# Patient Record
Sex: FEMALE | Race: BLACK OR AFRICAN AMERICAN | NOT HISPANIC OR LATINO | Employment: FULL TIME | URBAN - METROPOLITAN AREA
[De-identification: names, ages, dates, MRNs, and addresses within clinical notes are randomized per-mention and may not be internally consistent; named-entity substitution may affect disease eponyms.]

---

## 2017-06-20 ENCOUNTER — OFFICE VISIT - HEALTHEAST (OUTPATIENT)
Dept: FAMILY MEDICINE | Facility: CLINIC | Age: 52
End: 2017-06-20

## 2017-06-20 ENCOUNTER — RECORDS - HEALTHEAST (OUTPATIENT)
Dept: GENERAL RADIOLOGY | Facility: CLINIC | Age: 52
End: 2017-06-20

## 2017-06-20 DIAGNOSIS — R94.31 ABNORMAL EKG: ICD-10-CM

## 2017-06-20 DIAGNOSIS — Z11.3 SCREENING FOR STD (SEXUALLY TRANSMITTED DISEASE): ICD-10-CM

## 2017-06-20 DIAGNOSIS — E04.2 MULTIPLE THYROID NODULES: ICD-10-CM

## 2017-06-20 DIAGNOSIS — R07.9 CHEST PAIN, UNSPECIFIED: ICD-10-CM

## 2017-06-20 DIAGNOSIS — E66.9 OBESITY: ICD-10-CM

## 2017-06-20 DIAGNOSIS — D50.9 IRON DEFICIENCY ANEMIA: ICD-10-CM

## 2017-06-20 DIAGNOSIS — R07.9 CHEST PAIN, UNSPECIFIED TYPE: ICD-10-CM

## 2017-06-20 DIAGNOSIS — Z00.00 ROUTINE GENERAL MEDICAL EXAMINATION AT A HEALTH CARE FACILITY: ICD-10-CM

## 2017-06-20 LAB
ATRIAL RATE - MUSE: 58 BPM
DIASTOLIC BLOOD PRESSURE - MUSE: NORMAL MMHG
HIV 1+2 AB+HIV1 P24 AG SERPL QL IA: NEGATIVE
INTERPRETATION ECG - MUSE: NORMAL
P AXIS - MUSE: 56 DEGREES
PR INTERVAL - MUSE: 136 MS
QRS DURATION - MUSE: 76 MS
QT - MUSE: 434 MS
QTC - MUSE: 426 MS
R AXIS - MUSE: 45 DEGREES
SYSTOLIC BLOOD PRESSURE - MUSE: NORMAL MMHG
T AXIS - MUSE: 25 DEGREES
VENTRICULAR RATE- MUSE: 58 BPM

## 2017-06-20 ASSESSMENT — MIFFLIN-ST. JEOR: SCORE: 1372.46

## 2017-06-21 ENCOUNTER — HOSPITAL ENCOUNTER (OUTPATIENT)
Dept: MAMMOGRAPHY | Facility: CLINIC | Age: 52
Discharge: HOME | End: 2017-06-21
Attending: NURSE PRACTITIONER

## 2017-06-21 DIAGNOSIS — Z00.00 ROUTINE GENERAL MEDICAL EXAMINATION AT A HEALTH CARE FACILITY: ICD-10-CM

## 2017-06-21 LAB — SYPHILIS RPR SCREEN - HISTORICAL: NORMAL

## 2017-06-22 ENCOUNTER — COMMUNICATION - HEALTHEAST (OUTPATIENT)
Dept: FAMILY MEDICINE | Facility: CLINIC | Age: 52
End: 2017-06-22

## 2017-07-03 ENCOUNTER — HOSPITAL ENCOUNTER (OUTPATIENT)
Dept: CARDIOLOGY | Facility: CLINIC | Age: 52
Discharge: HOME | End: 2017-07-03
Attending: NURSE PRACTITIONER

## 2017-07-03 DIAGNOSIS — R07.9 CHEST PAIN, UNSPECIFIED TYPE: ICD-10-CM

## 2017-07-03 DIAGNOSIS — R94.31 ABNORMAL EKG: ICD-10-CM

## 2017-07-03 LAB
CV STRESS CURRENT BP HE: NORMAL
CV STRESS CURRENT HR HE: 102
CV STRESS CURRENT HR HE: 113
CV STRESS CURRENT HR HE: 115
CV STRESS CURRENT HR HE: 117
CV STRESS CURRENT HR HE: 124
CV STRESS CURRENT HR HE: 127
CV STRESS CURRENT HR HE: 128
CV STRESS CURRENT HR HE: 130
CV STRESS CURRENT HR HE: 143
CV STRESS CURRENT HR HE: 145
CV STRESS CURRENT HR HE: 147
CV STRESS CURRENT HR HE: 157
CV STRESS CURRENT HR HE: 157
CV STRESS CURRENT HR HE: 160
CV STRESS CURRENT HR HE: 163
CV STRESS CURRENT HR HE: 168
CV STRESS CURRENT HR HE: 173
CV STRESS CURRENT HR HE: 174
CV STRESS CURRENT HR HE: 174
CV STRESS CURRENT HR HE: 175
CV STRESS CURRENT HR HE: 175
CV STRESS CURRENT HR HE: 176
CV STRESS CURRENT HR HE: 83
CV STRESS CURRENT HR HE: 86
CV STRESS CURRENT HR HE: 87
CV STRESS CURRENT HR HE: 92
CV STRESS CURRENT HR HE: 94
CV STRESS CURRENT HR HE: 97
CV STRESS CURRENT HR HE: 99
CV STRESS DEVIATION TIME HE: NORMAL
CV STRESS ECHO PERCENT HR HE: NORMAL
CV STRESS EXERCISE STAGE HE: NORMAL
CV STRESS FINAL RESTING BP HE: NORMAL
CV STRESS FINAL RESTING HR HE: 92
CV STRESS MAX HR HE: 176
CV STRESS MAX TREADMILL GRADE HE: 14
CV STRESS MAX TREADMILL SPEED HE: 3.4
CV STRESS PEAK DIA BP HE: NORMAL
CV STRESS PEAK SYS BP HE: NORMAL
CV STRESS PHASE HE: NORMAL
CV STRESS PROTOCOL HE: NORMAL
CV STRESS RESTING PT POSITION HE: NORMAL
CV STRESS ST DEVIATION AMOUNT HE: NORMAL
CV STRESS ST DEVIATION ELEVATION HE: NORMAL
CV STRESS ST EVELATION AMOUNT HE: NORMAL
CV STRESS TEST TYPE HE: NORMAL
CV STRESS TOTAL STAGE TIME MIN 1 HE: NORMAL
ECHO EJECTION FRACTION ESTIMATED: 60 %
STRESS ECHO BASELINE BP: NORMAL
STRESS ECHO CALCULATED PERCENT HR: 103 %
STRESS ECHO LAST STRESS BP: NORMAL
STRESS ECHO LAST STRESS HR: 175
STRESS ECHO POST ESTIMATED WORKLOAD: 10.3
STRESS ECHO POST EXERCISE DUR MIN: 8
STRESS ECHO POST EXERCISE DUR SEC: 59
STRESS ECHO TARGET HR: 145

## 2017-09-12 ENCOUNTER — OFFICE VISIT - HEALTHEAST (OUTPATIENT)
Dept: FAMILY MEDICINE | Facility: CLINIC | Age: 52
End: 2017-09-12

## 2017-09-12 DIAGNOSIS — R49.0 HOARSENESS OF VOICE: ICD-10-CM

## 2017-09-12 DIAGNOSIS — E04.2 MULTINODULAR GOITER (NONTOXIC): ICD-10-CM

## 2017-09-12 ASSESSMENT — MIFFLIN-ST. JEOR: SCORE: 1369.29

## 2017-09-13 ENCOUNTER — HOSPITAL ENCOUNTER (OUTPATIENT)
Dept: ULTRASOUND IMAGING | Facility: CLINIC | Age: 52
Discharge: HOME | End: 2017-09-13
Attending: NURSE PRACTITIONER

## 2017-09-13 DIAGNOSIS — E04.2 MULTINODULAR GOITER (NONTOXIC): ICD-10-CM

## 2017-09-15 ENCOUNTER — COMMUNICATION - HEALTHEAST (OUTPATIENT)
Dept: ENDOCRINOLOGY | Facility: CLINIC | Age: 52
End: 2017-09-15

## 2017-09-15 ENCOUNTER — AMBULATORY - HEALTHEAST (OUTPATIENT)
Dept: FAMILY MEDICINE | Facility: CLINIC | Age: 52
End: 2017-09-15

## 2017-09-15 ENCOUNTER — COMMUNICATION - HEALTHEAST (OUTPATIENT)
Dept: FAMILY MEDICINE | Facility: CLINIC | Age: 52
End: 2017-09-15

## 2017-09-15 DIAGNOSIS — E04.9 THYROID GOITER: ICD-10-CM

## 2017-09-15 DIAGNOSIS — E04.1 THYROID NODULE: ICD-10-CM

## 2017-10-03 ENCOUNTER — HOSPITAL ENCOUNTER (OUTPATIENT)
Dept: ULTRASOUND IMAGING | Facility: CLINIC | Age: 52
Discharge: HOME | End: 2017-10-03
Attending: INTERNAL MEDICINE

## 2017-10-03 DIAGNOSIS — E04.1 THYROID NODULE: ICD-10-CM

## 2017-10-04 LAB
LAB AP CHARGES (HE HISTORICAL CONVERSION): NORMAL
LAB AP INITIAL CYTO EVAL (HE HISTORICAL CONVERSION): NORMAL
LAB MED GENERAL PATH INTERP (HE HISTORICAL CONVERSION): NORMAL
PATH REPORT.COMMENTS IMP SPEC: NORMAL
PATH REPORT.COMMENTS IMP SPEC: NORMAL
PATH REPORT.FINAL DX SPEC: NORMAL
PATH REPORT.RELEVANT HX SPEC: NORMAL
SPECIMEN DESCRIPTION: NORMAL

## 2017-11-09 ENCOUNTER — OFFICE VISIT - HEALTHEAST (OUTPATIENT)
Dept: OTOLARYNGOLOGY | Facility: CLINIC | Age: 52
End: 2017-11-09

## 2017-11-09 DIAGNOSIS — K21.9 LARYNGOPHARYNGEAL REFLUX (LPR): ICD-10-CM

## 2017-11-09 DIAGNOSIS — J37.0 CHRONIC LARYNGITIS: ICD-10-CM

## 2017-11-09 DIAGNOSIS — J38.3 VOCAL FOLD CYST: ICD-10-CM

## 2017-11-09 DIAGNOSIS — E04.2 MULTINODULAR GOITER: ICD-10-CM

## 2017-12-28 ENCOUNTER — COMMUNICATION - HEALTHEAST (OUTPATIENT)
Dept: FAMILY MEDICINE | Facility: CLINIC | Age: 52
End: 2017-12-28

## 2018-01-02 ENCOUNTER — OFFICE VISIT - HEALTHEAST (OUTPATIENT)
Dept: FAMILY MEDICINE | Facility: CLINIC | Age: 53
End: 2018-01-02

## 2018-01-02 DIAGNOSIS — R53.83 FATIGUE, UNSPECIFIED TYPE: ICD-10-CM

## 2018-01-02 LAB
ALBUMIN SERPL-MCNC: 3.4 G/DL (ref 3.5–5)
ALP SERPL-CCNC: 97 U/L (ref 45–120)
ALT SERPL W P-5'-P-CCNC: 14 U/L (ref 0–45)
ANION GAP SERPL CALCULATED.3IONS-SCNC: 10 MMOL/L (ref 5–18)
AST SERPL W P-5'-P-CCNC: 18 U/L (ref 0–40)
BILIRUB SERPL-MCNC: 0.3 MG/DL (ref 0–1)
BUN SERPL-MCNC: 10 MG/DL (ref 8–22)
CALCIUM SERPL-MCNC: 9.2 MG/DL (ref 8.5–10.5)
CHLORIDE BLD-SCNC: 107 MMOL/L (ref 98–107)
CO2 SERPL-SCNC: 25 MMOL/L (ref 22–31)
CREAT SERPL-MCNC: 0.75 MG/DL (ref 0.6–1.1)
ERYTHROCYTE [DISTWIDTH] IN BLOOD BY AUTOMATED COUNT: 12.5 % (ref 11–14.5)
ERYTHROCYTE [SEDIMENTATION RATE] IN BLOOD BY WESTERGREN METHOD: 32 MM/HR (ref 0–20)
GFR SERPL CREATININE-BSD FRML MDRD: >60 ML/MIN/1.73M2
GLUCOSE BLD-MCNC: 85 MG/DL (ref 70–125)
HCT VFR BLD AUTO: 35.4 % (ref 35–47)
HGB BLD-MCNC: 12 G/DL (ref 12–16)
IRON SATN MFR SERPL: 22 % (ref 20–50)
IRON SERPL-MCNC: 73 UG/DL (ref 42–175)
MCH RBC QN AUTO: 30.8 PG (ref 27–34)
MCHC RBC AUTO-ENTMCNC: 33.9 G/DL (ref 32–36)
MCV RBC AUTO: 91 FL (ref 80–100)
PLATELET # BLD AUTO: 308 THOU/UL (ref 140–440)
PMV BLD AUTO: 6.1 FL (ref 7–10)
POTASSIUM BLD-SCNC: 3.8 MMOL/L (ref 3.5–5)
PROT SERPL-MCNC: 7.4 G/DL (ref 6–8)
RBC # BLD AUTO: 3.9 MILL/UL (ref 3.8–5.4)
SODIUM SERPL-SCNC: 142 MMOL/L (ref 136–145)
TIBC SERPL-MCNC: 329 UG/DL (ref 313–563)
TRANSFERRIN SERPL-MCNC: 264 MG/DL (ref 212–360)
TSH SERPL DL<=0.005 MIU/L-ACNC: 0.74 UIU/ML (ref 0.3–5)
VIT B12 SERPL-MCNC: 469 PG/ML (ref 213–816)
WBC: 6.5 THOU/UL (ref 4–11)

## 2018-01-02 ASSESSMENT — MIFFLIN-ST. JEOR: SCORE: 1386.52

## 2018-01-03 LAB — 25(OH)D3 SERPL-MCNC: 16 NG/ML (ref 30–80)

## 2018-01-04 ENCOUNTER — AMBULATORY - HEALTHEAST (OUTPATIENT)
Dept: FAMILY MEDICINE | Facility: CLINIC | Age: 53
End: 2018-01-04

## 2018-01-04 ENCOUNTER — COMMUNICATION - HEALTHEAST (OUTPATIENT)
Dept: FAMILY MEDICINE | Facility: CLINIC | Age: 53
End: 2018-01-04

## 2018-01-04 DIAGNOSIS — E55.9 VITAMIN D DEFICIENCY: ICD-10-CM

## 2018-01-08 ENCOUNTER — OFFICE VISIT - HEALTHEAST (OUTPATIENT)
Dept: ENDOCRINOLOGY | Facility: CLINIC | Age: 53
End: 2018-01-08

## 2018-01-08 DIAGNOSIS — E04.1 THYROID NODULE: ICD-10-CM

## 2018-01-08 DIAGNOSIS — E55.9 VITAMIN D DEFICIENCY DISEASE: ICD-10-CM

## 2018-01-08 ASSESSMENT — MIFFLIN-ST. JEOR: SCORE: 1358.85

## 2018-02-27 ENCOUNTER — OFFICE VISIT - HEALTHEAST (OUTPATIENT)
Dept: FAMILY MEDICINE | Facility: CLINIC | Age: 53
End: 2018-02-27

## 2018-02-27 ENCOUNTER — RECORDS - HEALTHEAST (OUTPATIENT)
Dept: GENERAL RADIOLOGY | Facility: CLINIC | Age: 53
End: 2018-02-27

## 2018-02-27 DIAGNOSIS — M25.562 PAIN IN LEFT KNEE: ICD-10-CM

## 2018-02-27 DIAGNOSIS — M25.562 ACUTE PAIN OF LEFT KNEE: ICD-10-CM

## 2018-02-27 ASSESSMENT — MIFFLIN-ST. JEOR: SCORE: 1369.74

## 2018-04-24 ENCOUNTER — OFFICE VISIT - HEALTHEAST (OUTPATIENT)
Dept: FAMILY MEDICINE | Facility: CLINIC | Age: 53
End: 2018-04-24

## 2018-04-24 DIAGNOSIS — M79.675 TOE PAIN, LEFT: ICD-10-CM

## 2018-04-24 DIAGNOSIS — R63.2 BINGE EATING: ICD-10-CM

## 2018-04-24 DIAGNOSIS — D50.9 IRON DEFICIENCY ANEMIA, UNSPECIFIED IRON DEFICIENCY ANEMIA TYPE: ICD-10-CM

## 2018-04-24 DIAGNOSIS — E55.9 VITAMIN D DEFICIENCY: ICD-10-CM

## 2018-04-24 DIAGNOSIS — G56.02 CARPAL TUNNEL SYNDROME OF LEFT WRIST: ICD-10-CM

## 2018-04-24 LAB
FERRITIN SERPL-MCNC: 54 NG/ML (ref 10–130)
HBA1C MFR BLD: 5.7 % (ref 3.5–6)
IRON SATN MFR SERPL: 26 % (ref 20–50)
IRON SERPL-MCNC: 90 UG/DL (ref 42–175)
TIBC SERPL-MCNC: 341 UG/DL (ref 313–563)
TRANSFERRIN SERPL-MCNC: 273 MG/DL (ref 212–360)
TSH SERPL DL<=0.005 MIU/L-ACNC: 1.05 UIU/ML (ref 0.3–5)

## 2018-04-24 ASSESSMENT — MIFFLIN-ST. JEOR: SCORE: 1370.19

## 2018-04-25 LAB — 25(OH)D3 SERPL-MCNC: 24.4 NG/ML (ref 30–80)

## 2018-04-26 ENCOUNTER — COMMUNICATION - HEALTHEAST (OUTPATIENT)
Dept: FAMILY MEDICINE | Facility: CLINIC | Age: 53
End: 2018-04-26

## 2018-04-26 ENCOUNTER — AMBULATORY - HEALTHEAST (OUTPATIENT)
Dept: FAMILY MEDICINE | Facility: CLINIC | Age: 53
End: 2018-04-26

## 2018-04-26 DIAGNOSIS — E55.9 VITAMIN D DEFICIENCY: ICD-10-CM

## 2018-05-09 ENCOUNTER — RECORDS - HEALTHEAST (OUTPATIENT)
Dept: ADMINISTRATIVE | Facility: OTHER | Age: 53
End: 2018-05-09

## 2018-05-22 ENCOUNTER — OFFICE VISIT - HEALTHEAST (OUTPATIENT)
Dept: PODIATRY | Facility: CLINIC | Age: 53
End: 2018-05-22

## 2018-05-22 DIAGNOSIS — M79.2 NEURITIS: ICD-10-CM

## 2018-09-14 ENCOUNTER — COMMUNICATION - HEALTHEAST (OUTPATIENT)
Dept: SCHEDULING | Facility: CLINIC | Age: 53
End: 2018-09-14

## 2018-10-18 ENCOUNTER — COMMUNICATION - HEALTHEAST (OUTPATIENT)
Dept: FAMILY MEDICINE | Facility: CLINIC | Age: 53
End: 2018-10-18

## 2018-10-19 ENCOUNTER — COMMUNICATION - HEALTHEAST (OUTPATIENT)
Dept: FAMILY MEDICINE | Facility: CLINIC | Age: 53
End: 2018-10-19

## 2018-10-19 ENCOUNTER — OFFICE VISIT - HEALTHEAST (OUTPATIENT)
Dept: FAMILY MEDICINE | Facility: CLINIC | Age: 53
End: 2018-10-19

## 2018-10-19 DIAGNOSIS — N89.8 VAGINAL DISCHARGE: ICD-10-CM

## 2018-10-19 DIAGNOSIS — Z12.31 VISIT FOR SCREENING MAMMOGRAM: ICD-10-CM

## 2018-10-19 DIAGNOSIS — E04.1 THYROID NODULE: ICD-10-CM

## 2018-10-19 DIAGNOSIS — R73.03 PREDIABETES: ICD-10-CM

## 2018-10-19 DIAGNOSIS — Z12.4 PAP SMEAR FOR CERVICAL CANCER SCREENING: ICD-10-CM

## 2018-10-19 DIAGNOSIS — B96.89 BACTERIAL VAGINOSIS: ICD-10-CM

## 2018-10-19 DIAGNOSIS — N76.0 BACTERIAL VAGINOSIS: ICD-10-CM

## 2018-10-19 DIAGNOSIS — Z11.3 SCREENING FOR STD (SEXUALLY TRANSMITTED DISEASE): ICD-10-CM

## 2018-10-19 LAB
CLUE CELLS: ABNORMAL
HBA1C MFR BLD: 5.7 % (ref 3.5–6)
TRICHOMONAS, WET PREP: ABNORMAL
TSH SERPL DL<=0.005 MIU/L-ACNC: 1.38 UIU/ML (ref 0.3–5)
YEAST, WET PREP: ABNORMAL

## 2018-10-19 ASSESSMENT — MIFFLIN-ST. JEOR: SCORE: 1357.49

## 2018-10-22 LAB
C TRACH DNA SPEC QL PROBE+SIG AMP: NEGATIVE
HPV SOURCE: NORMAL
HUMAN PAPILLOMA VIRUS 16 DNA: NEGATIVE
HUMAN PAPILLOMA VIRUS 18 DNA: NEGATIVE
HUMAN PAPILLOMA VIRUS FINAL DIAGNOSIS: NORMAL
HUMAN PAPILLOMA VIRUS OTHER HR: NEGATIVE
N GONORRHOEA DNA SPEC QL NAA+PROBE: NEGATIVE
SPECIMEN DESCRIPTION: NORMAL

## 2018-10-26 LAB
BKR LAB AP ABNORMAL BLEEDING: YES
BKR LAB AP BIRTH CONTROL/HORMONES: NORMAL
BKR LAB AP CERVICAL APPEARANCE: NORMAL
BKR LAB AP GYN ADEQUACY: NORMAL
BKR LAB AP GYN INTERPRETATION: NORMAL
BKR LAB AP GYN OTHER FINDINGS: NORMAL
BKR LAB AP HPV REFLEX: NORMAL
BKR LAB AP LMP: NORMAL
BKR LAB AP PATIENT STATUS: NORMAL
BKR LAB AP PREVIOUS ABNORMAL: NORMAL
BKR LAB AP PREVIOUS NORMAL: 2012
HIGH RISK?: NO
PATH REPORT.COMMENTS IMP SPEC: NORMAL
RESULT FLAG (HE HISTORICAL CONVERSION): NORMAL

## 2018-10-28 ENCOUNTER — COMMUNICATION - HEALTHEAST (OUTPATIENT)
Dept: FAMILY MEDICINE | Facility: CLINIC | Age: 53
End: 2018-10-28

## 2018-11-29 ENCOUNTER — OFFICE VISIT - HEALTHEAST (OUTPATIENT)
Dept: FAMILY MEDICINE | Facility: CLINIC | Age: 53
End: 2018-11-29

## 2018-11-29 ENCOUNTER — HOSPITAL ENCOUNTER (OUTPATIENT)
Dept: MAMMOGRAPHY | Facility: CLINIC | Age: 53
Discharge: HOME | End: 2018-11-29
Attending: NURSE PRACTITIONER

## 2018-11-29 DIAGNOSIS — Z12.31 VISIT FOR SCREENING MAMMOGRAM: ICD-10-CM

## 2018-11-29 DIAGNOSIS — J20.6 ACUTE BRONCHITIS DUE TO RHINOVIRUS: ICD-10-CM

## 2018-11-29 RX ORDER — ALBUTEROL SULFATE 90 UG/1
2 AEROSOL, METERED RESPIRATORY (INHALATION) EVERY 6 HOURS PRN
Qty: 1 EACH | Refills: 0 | Status: SHIPPED | OUTPATIENT
Start: 2018-11-29 | End: 2022-02-17

## 2019-02-08 ENCOUNTER — OFFICE VISIT - HEALTHEAST (OUTPATIENT)
Dept: FAMILY MEDICINE | Facility: CLINIC | Age: 54
End: 2019-02-08

## 2019-02-08 DIAGNOSIS — R73.03 PREDIABETES: ICD-10-CM

## 2019-02-08 DIAGNOSIS — E04.1 THYROID NODULE: ICD-10-CM

## 2019-02-08 DIAGNOSIS — R10.2 PELVIC PAIN IN FEMALE: ICD-10-CM

## 2019-02-08 DIAGNOSIS — E55.9 VITAMIN D DEFICIENCY: ICD-10-CM

## 2019-02-08 DIAGNOSIS — R70.0 ELEVATED SED RATE: ICD-10-CM

## 2019-02-08 DIAGNOSIS — R53.83 FATIGUE, UNSPECIFIED TYPE: ICD-10-CM

## 2019-02-08 DIAGNOSIS — Z11.3 SCREENING EXAMINATION FOR STD (SEXUALLY TRANSMITTED DISEASE): ICD-10-CM

## 2019-02-08 LAB
25(OH)D3 SERPL-MCNC: 20 NG/ML (ref 30–80)
ALBUMIN SERPL-MCNC: 3.6 G/DL (ref 3.5–5)
ALBUMIN UR-MCNC: NEGATIVE MG/DL
ALP SERPL-CCNC: 80 U/L (ref 45–120)
ALT SERPL W P-5'-P-CCNC: 13 U/L (ref 0–45)
ANION GAP SERPL CALCULATED.3IONS-SCNC: 6 MMOL/L (ref 5–18)
APPEARANCE UR: CLEAR
AST SERPL W P-5'-P-CCNC: 18 U/L (ref 0–40)
BACTERIA #/AREA URNS HPF: ABNORMAL HPF
BILIRUB SERPL-MCNC: 0.3 MG/DL (ref 0–1)
BILIRUB UR QL STRIP: NEGATIVE
BUN SERPL-MCNC: 12 MG/DL (ref 8–22)
CALCIUM SERPL-MCNC: 9.4 MG/DL (ref 8.5–10.5)
CHLORIDE BLD-SCNC: 107 MMOL/L (ref 98–107)
CO2 SERPL-SCNC: 27 MMOL/L (ref 22–31)
COLOR UR AUTO: YELLOW
CREAT SERPL-MCNC: 0.82 MG/DL (ref 0.6–1.1)
ERYTHROCYTE [DISTWIDTH] IN BLOOD BY AUTOMATED COUNT: 11.6 % (ref 11–14.5)
ERYTHROCYTE [SEDIMENTATION RATE] IN BLOOD BY WESTERGREN METHOD: 23 MM/HR (ref 0–20)
GFR SERPL CREATININE-BSD FRML MDRD: >60 ML/MIN/1.73M2
GLUCOSE BLD-MCNC: 62 MG/DL (ref 70–125)
GLUCOSE UR STRIP-MCNC: NEGATIVE MG/DL
HBA1C MFR BLD: 5.3 % (ref 3.5–6)
HCT VFR BLD AUTO: 37.1 % (ref 35–47)
HGB BLD-MCNC: 12.3 G/DL (ref 12–16)
HGB UR QL STRIP: ABNORMAL
KETONES UR STRIP-MCNC: NEGATIVE MG/DL
LEUKOCYTE ESTERASE UR QL STRIP: NEGATIVE
MCH RBC QN AUTO: 31.2 PG (ref 27–34)
MCHC RBC AUTO-ENTMCNC: 33.1 G/DL (ref 32–36)
MCV RBC AUTO: 94 FL (ref 80–100)
NITRATE UR QL: NEGATIVE
PH UR STRIP: 5.5 [PH] (ref 5–8)
PLATELET # BLD AUTO: 349 THOU/UL (ref 140–440)
PMV BLD AUTO: 6.4 FL (ref 7–10)
POTASSIUM BLD-SCNC: 4.2 MMOL/L (ref 3.5–5)
PROT SERPL-MCNC: 7.1 G/DL (ref 6–8)
RBC # BLD AUTO: 3.94 MILL/UL (ref 3.8–5.4)
RBC #/AREA URNS AUTO: ABNORMAL HPF
RHEUMATOID FACT SERPL-ACNC: 16.6 IU/ML (ref 0–30)
SODIUM SERPL-SCNC: 140 MMOL/L (ref 136–145)
SP GR UR STRIP: 1.02 (ref 1–1.03)
SQUAMOUS #/AREA URNS AUTO: ABNORMAL LPF
TSH SERPL DL<=0.005 MIU/L-ACNC: 0.99 UIU/ML (ref 0.3–5)
UROBILINOGEN UR STRIP-ACNC: ABNORMAL
VIT B12 SERPL-MCNC: 536 PG/ML (ref 213–816)
WBC #/AREA URNS AUTO: ABNORMAL HPF
WBC: 4.2 THOU/UL (ref 4–11)

## 2019-02-08 ASSESSMENT — MIFFLIN-ST. JEOR: SCORE: 1384.25

## 2019-02-09 LAB — BACTERIA SPEC CULT: NO GROWTH

## 2019-02-11 LAB
ANA SER QL: 0.7 U
C TRACH DNA SPEC QL PROBE+SIG AMP: NEGATIVE
N GONORRHOEA DNA SPEC QL NAA+PROBE: NEGATIVE

## 2019-02-14 ENCOUNTER — COMMUNICATION - HEALTHEAST (OUTPATIENT)
Dept: FAMILY MEDICINE | Facility: CLINIC | Age: 54
End: 2019-02-14

## 2019-03-06 ENCOUNTER — COMMUNICATION - HEALTHEAST (OUTPATIENT)
Dept: FAMILY MEDICINE | Facility: CLINIC | Age: 54
End: 2019-03-06

## 2019-03-08 ENCOUNTER — HOSPITAL ENCOUNTER (OUTPATIENT)
Dept: ULTRASOUND IMAGING | Facility: CLINIC | Age: 54
Discharge: HOME | End: 2019-03-08
Attending: NURSE PRACTITIONER

## 2019-03-08 DIAGNOSIS — R10.2 PELVIC PAIN IN FEMALE: ICD-10-CM

## 2019-03-18 ENCOUNTER — COMMUNICATION - HEALTHEAST (OUTPATIENT)
Dept: FAMILY MEDICINE | Facility: CLINIC | Age: 54
End: 2019-03-18

## 2019-03-22 ENCOUNTER — AMBULATORY - HEALTHEAST (OUTPATIENT)
Dept: FAMILY MEDICINE | Facility: CLINIC | Age: 54
End: 2019-03-22

## 2019-03-22 DIAGNOSIS — R10.2 PELVIC PAIN IN FEMALE: ICD-10-CM

## 2019-04-05 ENCOUNTER — RECORDS - HEALTHEAST (OUTPATIENT)
Dept: ADMINISTRATIVE | Facility: OTHER | Age: 54
End: 2019-04-05

## 2019-06-25 ENCOUNTER — AMBULATORY - HEALTHEAST (OUTPATIENT)
Dept: FAMILY MEDICINE | Facility: CLINIC | Age: 54
End: 2019-06-25

## 2019-06-25 ENCOUNTER — COMMUNICATION - HEALTHEAST (OUTPATIENT)
Dept: SCHEDULING | Facility: CLINIC | Age: 54
End: 2019-06-25

## 2019-06-25 DIAGNOSIS — E55.9 VITAMIN D DEFICIENCY: ICD-10-CM

## 2019-06-28 ENCOUNTER — AMBULATORY - HEALTHEAST (OUTPATIENT)
Dept: LAB | Facility: CLINIC | Age: 54
End: 2019-06-28

## 2019-06-28 DIAGNOSIS — E55.9 VITAMIN D DEFICIENCY: ICD-10-CM

## 2019-07-01 ENCOUNTER — COMMUNICATION - HEALTHEAST (OUTPATIENT)
Dept: FAMILY MEDICINE | Facility: CLINIC | Age: 54
End: 2019-07-01

## 2019-07-01 LAB — 25(OH)D3 SERPL-MCNC: 31.5 NG/ML (ref 30–80)

## 2019-10-06 ENCOUNTER — COMMUNICATION - HEALTHEAST (OUTPATIENT)
Dept: SCHEDULING | Facility: CLINIC | Age: 54
End: 2019-10-06

## 2019-10-07 ENCOUNTER — RECORDS - HEALTHEAST (OUTPATIENT)
Dept: GENERAL RADIOLOGY | Facility: CLINIC | Age: 54
End: 2019-10-07

## 2019-10-07 ENCOUNTER — OFFICE VISIT - HEALTHEAST (OUTPATIENT)
Dept: FAMILY MEDICINE | Facility: CLINIC | Age: 54
End: 2019-10-07

## 2019-10-07 DIAGNOSIS — M54.6 ACUTE BILATERAL THORACIC BACK PAIN: ICD-10-CM

## 2019-10-07 DIAGNOSIS — G47.00 INSOMNIA, UNSPECIFIED TYPE: ICD-10-CM

## 2019-10-07 DIAGNOSIS — M25.552 PAIN IN LEFT HIP: ICD-10-CM

## 2019-10-07 DIAGNOSIS — M25.552 HIP PAIN, LEFT: ICD-10-CM

## 2019-10-07 ASSESSMENT — MIFFLIN-ST. JEOR: SCORE: 1428.25

## 2019-10-08 ENCOUNTER — COMMUNICATION - HEALTHEAST (OUTPATIENT)
Dept: FAMILY MEDICINE | Facility: CLINIC | Age: 54
End: 2019-10-08

## 2019-10-28 ENCOUNTER — OFFICE VISIT - HEALTHEAST (OUTPATIENT)
Dept: PHYSICAL THERAPY | Facility: REHABILITATION | Age: 54
End: 2019-10-28

## 2019-10-28 DIAGNOSIS — M54.6 ACUTE LEFT-SIDED THORACIC BACK PAIN: ICD-10-CM

## 2019-11-01 ENCOUNTER — RECORDS - HEALTHEAST (OUTPATIENT)
Dept: ADMINISTRATIVE | Facility: OTHER | Age: 54
End: 2019-11-01

## 2019-11-15 ENCOUNTER — OFFICE VISIT - HEALTHEAST (OUTPATIENT)
Dept: PHYSICAL THERAPY | Facility: REHABILITATION | Age: 54
End: 2019-11-15

## 2019-11-15 DIAGNOSIS — M54.6 ACUTE LEFT-SIDED THORACIC BACK PAIN: ICD-10-CM

## 2020-02-20 ENCOUNTER — OFFICE VISIT - HEALTHEAST (OUTPATIENT)
Dept: FAMILY MEDICINE | Facility: CLINIC | Age: 55
End: 2020-02-20

## 2020-02-20 DIAGNOSIS — R10.2 PELVIC PAIN IN FEMALE: ICD-10-CM

## 2020-02-20 DIAGNOSIS — E04.1 THYROID NODULE: ICD-10-CM

## 2020-02-20 DIAGNOSIS — E55.9 VITAMIN D DEFICIENCY: ICD-10-CM

## 2020-02-20 DIAGNOSIS — Z12.31 VISIT FOR SCREENING MAMMOGRAM: ICD-10-CM

## 2020-02-20 DIAGNOSIS — M54.50 LUMBAR PAIN: ICD-10-CM

## 2020-02-20 LAB
ALBUMIN UR-MCNC: NEGATIVE MG/DL
APPEARANCE UR: CLEAR
BILIRUB UR QL STRIP: NEGATIVE
CLUE CELLS: NORMAL
COLOR UR AUTO: YELLOW
GLUCOSE UR STRIP-MCNC: NEGATIVE MG/DL
HGB UR QL STRIP: NEGATIVE
KETONES UR STRIP-MCNC: NEGATIVE MG/DL
LEUKOCYTE ESTERASE UR QL STRIP: NEGATIVE
NITRATE UR QL: NEGATIVE
PH UR STRIP: 5.5 [PH] (ref 5–8)
SP GR UR STRIP: 1.02 (ref 1–1.03)
TRICHOMONAS, WET PREP: NORMAL
TSH SERPL DL<=0.005 MIU/L-ACNC: 1.42 UIU/ML (ref 0.3–5)
UROBILINOGEN UR STRIP-ACNC: NORMAL
YEAST, WET PREP: NORMAL

## 2020-02-21 LAB — 25(OH)D3 SERPL-MCNC: 38.4 NG/ML (ref 30–80)

## 2020-02-23 ENCOUNTER — COMMUNICATION - HEALTHEAST (OUTPATIENT)
Dept: FAMILY MEDICINE | Facility: CLINIC | Age: 55
End: 2020-02-23

## 2020-02-27 ENCOUNTER — COMMUNICATION - HEALTHEAST (OUTPATIENT)
Dept: SCHEDULING | Facility: CLINIC | Age: 55
End: 2020-02-27

## 2020-03-06 ENCOUNTER — HOSPITAL ENCOUNTER (OUTPATIENT)
Dept: ULTRASOUND IMAGING | Facility: CLINIC | Age: 55
Discharge: HOME | End: 2020-03-06
Attending: NURSE PRACTITIONER

## 2020-03-06 DIAGNOSIS — E04.1 THYROID NODULE: ICD-10-CM

## 2020-03-06 DIAGNOSIS — R10.2 PELVIC PAIN IN FEMALE: ICD-10-CM

## 2020-03-08 ENCOUNTER — AMBULATORY - HEALTHEAST (OUTPATIENT)
Dept: FAMILY MEDICINE | Facility: CLINIC | Age: 55
End: 2020-03-08

## 2020-03-08 DIAGNOSIS — E04.1 THYROID NODULE: ICD-10-CM

## 2020-03-09 ENCOUNTER — COMMUNICATION - HEALTHEAST (OUTPATIENT)
Dept: FAMILY MEDICINE | Facility: CLINIC | Age: 55
End: 2020-03-09

## 2020-03-31 ENCOUNTER — HOSPITAL ENCOUNTER (OUTPATIENT)
Dept: ULTRASOUND IMAGING | Facility: CLINIC | Age: 55
Discharge: HOME | End: 2020-03-31
Attending: NURSE PRACTITIONER | Admitting: RADIOLOGY

## 2020-03-31 DIAGNOSIS — E04.1 THYROID NODULE: ICD-10-CM

## 2020-04-03 LAB
CAP COMMENT: NORMAL
LAB AP CHARGES (HE HISTORICAL CONVERSION): NORMAL
LAB AP INITIAL CYTO EVAL (HE HISTORICAL CONVERSION): NORMAL
LAB MED GENERAL PATH INTERP (HE HISTORICAL CONVERSION): NORMAL
PATH REPORT.COMMENTS IMP SPEC: NORMAL
PATH REPORT.COMMENTS IMP SPEC: NORMAL
PATH REPORT.FINAL DX SPEC: NORMAL
PATH REPORT.MICROSCOPIC SPEC OTHER STN: NORMAL
PATH REPORT.RELEVANT HX SPEC: NORMAL
SPECIMEN DESCRIPTION: NORMAL

## 2020-04-13 ENCOUNTER — COMMUNICATION - HEALTHEAST (OUTPATIENT)
Dept: FAMILY MEDICINE | Facility: CLINIC | Age: 55
End: 2020-04-13

## 2020-10-20 ENCOUNTER — OFFICE VISIT - HEALTHEAST (OUTPATIENT)
Dept: FAMILY MEDICINE | Facility: CLINIC | Age: 55
End: 2020-10-20

## 2020-10-20 DIAGNOSIS — J30.9 ALLERGIC RHINITIS, UNSPECIFIED SEASONALITY, UNSPECIFIED TRIGGER: ICD-10-CM

## 2020-10-20 DIAGNOSIS — H10.13 ALLERGIC CONJUNCTIVITIS, BILATERAL: ICD-10-CM

## 2020-10-20 DIAGNOSIS — R10.32 ABDOMINAL PAIN, LEFT LOWER QUADRANT: ICD-10-CM

## 2020-10-20 DIAGNOSIS — E04.1 THYROID NODULE: ICD-10-CM

## 2020-10-20 LAB
ALBUMIN SERPL-MCNC: 4.1 G/DL (ref 3.5–5)
ALBUMIN UR-MCNC: NEGATIVE MG/DL
ALP SERPL-CCNC: 114 U/L (ref 45–120)
ALT SERPL W P-5'-P-CCNC: 14 U/L (ref 0–45)
ANION GAP SERPL CALCULATED.3IONS-SCNC: 8 MMOL/L (ref 5–18)
APPEARANCE UR: CLEAR
AST SERPL W P-5'-P-CCNC: 20 U/L (ref 0–40)
BACTERIA #/AREA URNS HPF: ABNORMAL HPF
BILIRUB SERPL-MCNC: 0.3 MG/DL (ref 0–1)
BILIRUB UR QL STRIP: NEGATIVE
BUN SERPL-MCNC: 14 MG/DL (ref 8–22)
CALCIUM SERPL-MCNC: 9.6 MG/DL (ref 8.5–10.5)
CHLORIDE BLD-SCNC: 105 MMOL/L (ref 98–107)
CLUE CELLS: ABNORMAL
CO2 SERPL-SCNC: 28 MMOL/L (ref 22–31)
COLOR UR AUTO: YELLOW
CREAT SERPL-MCNC: 0.93 MG/DL (ref 0.6–1.1)
ERYTHROCYTE [DISTWIDTH] IN BLOOD BY AUTOMATED COUNT: 12.1 % (ref 11–14.5)
GFR SERPL CREATININE-BSD FRML MDRD: >60 ML/MIN/1.73M2
GLUCOSE BLD-MCNC: 86 MG/DL (ref 70–125)
GLUCOSE UR STRIP-MCNC: NEGATIVE MG/DL
HCT VFR BLD AUTO: 36.1 % (ref 35–47)
HGB BLD-MCNC: 12.3 G/DL (ref 12–16)
HGB UR QL STRIP: ABNORMAL
KETONES UR STRIP-MCNC: NEGATIVE MG/DL
LEUKOCYTE ESTERASE UR QL STRIP: NEGATIVE
MCH RBC QN AUTO: 31.2 PG (ref 27–34)
MCHC RBC AUTO-ENTMCNC: 34.1 G/DL (ref 32–36)
MCV RBC AUTO: 91 FL (ref 80–100)
NITRATE UR QL: NEGATIVE
PH UR STRIP: 6 [PH] (ref 5–8)
PLATELET # BLD AUTO: 389 THOU/UL (ref 140–440)
PMV BLD AUTO: 6.4 FL (ref 7–10)
POTASSIUM BLD-SCNC: 4.1 MMOL/L (ref 3.5–5)
PROT SERPL-MCNC: 8 G/DL (ref 6–8)
RBC # BLD AUTO: 3.95 MILL/UL (ref 3.8–5.4)
RBC #/AREA URNS AUTO: ABNORMAL HPF
SODIUM SERPL-SCNC: 141 MMOL/L (ref 136–145)
SP GR UR STRIP: 1.02 (ref 1–1.03)
SQUAMOUS #/AREA URNS AUTO: ABNORMAL LPF
TRICHOMONAS, WET PREP: ABNORMAL
TSH SERPL DL<=0.005 MIU/L-ACNC: 1.38 UIU/ML (ref 0.3–5)
UROBILINOGEN UR STRIP-ACNC: ABNORMAL
WBC #/AREA URNS AUTO: ABNORMAL HPF
WBC: 6.1 THOU/UL (ref 4–11)
YEAST, WET PREP: ABNORMAL

## 2020-10-20 RX ORDER — OLOPATADINE HYDROCHLORIDE 1 MG/ML
1 SOLUTION/ DROPS OPHTHALMIC 2 TIMES DAILY
Qty: 5 ML | Refills: 1 | Status: SHIPPED | OUTPATIENT
Start: 2020-10-20 | End: 2022-05-02

## 2020-10-21 ENCOUNTER — COMMUNICATION - HEALTHEAST (OUTPATIENT)
Dept: FAMILY MEDICINE | Facility: CLINIC | Age: 55
End: 2020-10-21

## 2020-10-21 ENCOUNTER — AMBULATORY - HEALTHEAST (OUTPATIENT)
Dept: FAMILY MEDICINE | Facility: CLINIC | Age: 55
End: 2020-10-21

## 2020-10-21 DIAGNOSIS — N76.0 BACTERIAL VAGINOSIS: ICD-10-CM

## 2020-10-21 DIAGNOSIS — J20.6 ACUTE BRONCHITIS DUE TO RHINOVIRUS: ICD-10-CM

## 2020-10-21 DIAGNOSIS — B96.89 BACTERIAL VAGINOSIS: ICD-10-CM

## 2020-10-21 DIAGNOSIS — R10.32 ABDOMINAL PAIN, LEFT LOWER QUADRANT: ICD-10-CM

## 2020-10-21 DIAGNOSIS — R31.29 MICROSCOPIC HEMATURIA: ICD-10-CM

## 2020-10-22 ENCOUNTER — HOSPITAL ENCOUNTER (OUTPATIENT)
Dept: CT IMAGING | Facility: CLINIC | Age: 55
Discharge: HOME | End: 2020-10-22
Attending: NURSE PRACTITIONER

## 2020-10-22 DIAGNOSIS — R31.29 MICROSCOPIC HEMATURIA: ICD-10-CM

## 2020-10-22 DIAGNOSIS — R10.32 ABDOMINAL PAIN, LEFT LOWER QUADRANT: ICD-10-CM

## 2020-10-23 ENCOUNTER — COMMUNICATION - HEALTHEAST (OUTPATIENT)
Dept: FAMILY MEDICINE | Facility: CLINIC | Age: 55
End: 2020-10-23

## 2020-10-23 LAB
A ALTERNATA IGE QN: <0.35 KU/L
A FUMIGATUS IGE QN: <0.35 KU/L
BERMUDA GRASS IGE QN: <0.35 KU/L
C HERBARUM IGE QN: <0.35 KU/L
CAT DANDER IGG QN: <0.35 KU/L
CEDAR IGE QN: <0.35 KU/L
COMMON RAGWEED IGE QN: <0.35 KU/L
COTTONWOOD IGE QN: <0.35 KU/L
D FARINAE IGE QN: <0.35 KU/L
D PTERONYSS IGE QN: <0.35 KU/L
DOG DANDER+EPITH IGE QN: <0.35 KU/L
MAPLE IGE QN: <0.35 KU/L
MARSH ELDER IGE QN: <0.35 KU/L
MOUSE URINE PROT IGE QN: <0.35 KU/L
NETTLE IGE QN: <0.35 KU/L
P NOTATUM IGE QN: <0.35 KU/L
ROACH IGE QN: <0.35 KU/L
SALTWORT IGE QN: <0.35 KU/L
SILVER BIRCH IGE QN: <0.35 KU/L
TIMOTHY IGE QN: <0.35 KU/L
TOTAL IGE - HISTORICAL: 23.5 KU/L (ref 0–100)
WHITE ASH IGE QN: <0.35 KU/L
WHITE ELM IGE QN: <0.35 KU/L
WHITE MULBERRY IGE QN: <0.35 KU/L
WHITE OAK IGE QN: <0.35 KU/L

## 2020-10-29 ENCOUNTER — AMBULATORY - HEALTHEAST (OUTPATIENT)
Dept: FAMILY MEDICINE | Facility: CLINIC | Age: 55
End: 2020-10-29

## 2020-10-29 ENCOUNTER — COMMUNICATION - HEALTHEAST (OUTPATIENT)
Dept: FAMILY MEDICINE | Facility: CLINIC | Age: 55
End: 2020-10-29

## 2020-10-29 DIAGNOSIS — R10.32 ABDOMINAL PAIN, LEFT LOWER QUADRANT: ICD-10-CM

## 2021-03-05 ENCOUNTER — COMMUNICATION - HEALTHEAST (OUTPATIENT)
Dept: TELEHEALTH | Facility: CLINIC | Age: 56
End: 2021-03-05

## 2021-03-05 ENCOUNTER — OFFICE VISIT - HEALTHEAST (OUTPATIENT)
Dept: FAMILY MEDICINE | Facility: CLINIC | Age: 56
End: 2021-03-05

## 2021-03-05 ENCOUNTER — COMMUNICATION - HEALTHEAST (OUTPATIENT)
Dept: FAMILY MEDICINE | Facility: CLINIC | Age: 56
End: 2021-03-05

## 2021-03-05 DIAGNOSIS — R31.29 MICROSCOPIC HEMATURIA: ICD-10-CM

## 2021-03-05 DIAGNOSIS — M54.9 MID BACK PAIN ON RIGHT SIDE: ICD-10-CM

## 2021-03-05 DIAGNOSIS — Z12.31 VISIT FOR SCREENING MAMMOGRAM: ICD-10-CM

## 2021-03-05 LAB
ALBUMIN UR-MCNC: ABNORMAL MG/DL
APPEARANCE UR: CLEAR
BACTERIA #/AREA URNS HPF: ABNORMAL HPF
BILIRUB UR QL STRIP: ABNORMAL
COLOR UR AUTO: YELLOW
GLUCOSE UR STRIP-MCNC: NEGATIVE MG/DL
HGB UR QL STRIP: NEGATIVE
KETONES UR STRIP-MCNC: NEGATIVE MG/DL
LEUKOCYTE ESTERASE UR QL STRIP: NEGATIVE
MUCOUS THREADS #/AREA URNS LPF: ABNORMAL LPF
NITRATE UR QL: NEGATIVE
PH UR STRIP: 5.5 [PH] (ref 5–8)
RBC #/AREA URNS AUTO: ABNORMAL HPF
SP GR UR STRIP: >=1.03 (ref 1–1.03)
SQUAMOUS #/AREA URNS AUTO: ABNORMAL LPF
UROBILINOGEN UR STRIP-ACNC: ABNORMAL
WBC #/AREA URNS AUTO: ABNORMAL HPF

## 2021-03-06 LAB — BACTERIA SPEC CULT: NO GROWTH

## 2021-03-08 ENCOUNTER — COMMUNICATION - HEALTHEAST (OUTPATIENT)
Dept: FAMILY MEDICINE | Facility: CLINIC | Age: 56
End: 2021-03-08

## 2021-05-28 ENCOUNTER — COMMUNICATION - HEALTHEAST (OUTPATIENT)
Dept: TELEHEALTH | Facility: CLINIC | Age: 56
End: 2021-05-28

## 2021-05-28 ENCOUNTER — OFFICE VISIT - HEALTHEAST (OUTPATIENT)
Dept: FAMILY MEDICINE | Facility: CLINIC | Age: 56
End: 2021-05-28

## 2021-05-28 DIAGNOSIS — G47.00 INSOMNIA, UNSPECIFIED TYPE: ICD-10-CM

## 2021-05-28 RX ORDER — HYDROXYZINE PAMOATE 50 MG/1
50-100 CAPSULE ORAL
Qty: 60 CAPSULE | Refills: 2 | Status: SHIPPED | OUTPATIENT
Start: 2021-05-28 | End: 2022-09-06

## 2021-05-30 NOTE — TELEPHONE ENCOUNTER
Message to KYA Carranza NP. Looking for refill of Vitamin D. She wants to know: if she needs to have her Vitamin D level rechecked first?  Pharmacy: [facility] [address] in [facility].    Message will be forwarded to KYA Carranza NP.    Ana María La RN Care Connection Triage Nurse    Reason for Disposition    Caller has NON-URGENT medication question about med that PCP prescribed and triager unable to answer question    Protocols used: MEDICATION QUESTION CALL-A-AH

## 2021-05-30 NOTE — TELEPHONE ENCOUNTER
Left message to call back for: medication refill  Information to relay to patient:  Below message. Please help schedule a lab only visit upon return phone call.

## 2021-05-30 NOTE — TELEPHONE ENCOUNTER
Patient Returning Call  Reason for call:  Message from clinic  Information relayed to patient:  Olesya Wilson CMA           8:14 AM   Note      Left message to call back for: medication refill  Information to relay to patient:  Below message. Please help schedule a lab only visit upon return phone call.           Patient has additional questions:  No  If YES, what are your questions/concerns:  Patient was transferred to scheduling.  Okay to leave a detailed message?: No call back needed

## 2021-05-31 VITALS — BODY MASS INDEX: 30.65 KG/M2 | HEIGHT: 63 IN | WEIGHT: 173 LBS

## 2021-05-31 VITALS — HEIGHT: 63 IN | WEIGHT: 176 LBS | BODY MASS INDEX: 31.18 KG/M2

## 2021-05-31 VITALS — BODY MASS INDEX: 31.73 KG/M2 | WEIGHT: 179.1 LBS | HEIGHT: 63 IN

## 2021-05-31 VITALS — HEIGHT: 63 IN | BODY MASS INDEX: 31.06 KG/M2 | WEIGHT: 175.3 LBS

## 2021-06-01 VITALS — HEIGHT: 63 IN | WEIGHT: 175.4 LBS | BODY MASS INDEX: 31.08 KG/M2

## 2021-06-01 VITALS — BODY MASS INDEX: 31.1 KG/M2 | HEIGHT: 63 IN | WEIGHT: 175.5 LBS

## 2021-06-02 VITALS — BODY MASS INDEX: 31.64 KG/M2 | HEIGHT: 63 IN | WEIGHT: 178.6 LBS

## 2021-06-02 VITALS — WEIGHT: 174 LBS | BODY MASS INDEX: 30.82 KG/M2

## 2021-06-02 VITALS — BODY MASS INDEX: 30.6 KG/M2 | HEIGHT: 63 IN | WEIGHT: 172.7 LBS

## 2021-06-02 NOTE — PROGRESS NOTES
Assessment and Plan:     1. Insomnia, unspecified type  Discussed good sleep hygiene.  Discussed symptomatic treatment.  Will treat with hydroxyzine use as needed.  She is to avoid taking this with other sedatives.  - hydrOXYzine pamoate (VISTARIL) 50 MG capsule; Take 1-2 capsules ( mg total) by mouth at bedtime as needed.  Dispense: 30 capsule; Refill: 0    2. Hip pain, left  Hip x-ray shows no acute fracture.  Will have radiology review.  Other differentials include labral tear and trochanteric bursitis.  Discussed symptomatic treatment with over-the-counter acetaminophen or ibuprofen.  Will refer to orthopedics for further evaluation.  - XR Hip Left 2 or More VWS; Future  - Ambulatory referral to Orthopedics    3. Acute bilateral thoracic back pain  Differentials include myofascial pain, bulging or herniated disc.  Patient appears to have muscular tension. Offered muscle relaxant, but she declines.  Discussed symptomatic treatment rest, ice, stretching activities.  Will refer to PT/OT for further evaluation.  She is content with the plan.  - Ambulatory referral to PT/OT    Subjective:     Meera is a 53 y.o. female presenting to the clinic for multiple concerns today.  Patient presents today with concerns of left thoracic back pain which developed last night.  She has a history of this a few months ago.  Patient states the pain was severe when she moved from side to side.  Laying down and resting assisted the pain.  She did take ibuprofen last night.  She now complains of a dull ache today.  She denies numbness and tingling of her extremities.  She has not had any recent cold symptoms or fever.  Patient is also concerned of ongoing left hip pain.  1 month ago, she was dancing at a wedding reception when she felt popping sensation within her left hip.  She had immediate pain and had difficulty ambulating after this.  Since then, her hips have been stiff.  She was exercising at that time and stopped her  exercise regimen.  She continues to experience a dull ache within the hip.  Lastly, patient is concerned of insomnia.  She has had difficulty falling asleep and staying asleep over the past year.  She has tried melatonin with no relief.  She is waking up at least 2-3 times per night.  She does admit to working on the computer prior to bed.    Review of Systems: A complete 14 point review of systems was obtained and is negative or as stated in the history of present illness.    Social History     Socioeconomic History     Marital status: Single     Spouse name: Not on file     Number of children: Not on file     Years of education: Not on file     Highest education level: Not on file   Occupational History     Not on file   Social Needs     Financial resource strain: Not on file     Food insecurity:     Worry: Not on file     Inability: Not on file     Transportation needs:     Medical: Not on file     Non-medical: Not on file   Tobacco Use     Smoking status: Never Smoker     Smokeless tobacco: Never Used   Substance and Sexual Activity     Alcohol use: No     Drug use: No     Sexual activity: Yes     Partners: Male     Comment: in relationship   Lifestyle     Physical activity:     Days per week: Not on file     Minutes per session: Not on file     Stress: Not on file   Relationships     Social connections:     Talks on phone: Not on file     Gets together: Not on file     Attends Buddhism service: Not on file     Active member of club or organization: Not on file     Attends meetings of clubs or organizations: Not on file     Relationship status: Not on file     Intimate partner violence:     Fear of current or ex partner: Not on file     Emotionally abused: Not on file     Physically abused: Not on file     Forced sexual activity: Not on file   Other Topics Concern     Not on file   Social History Narrative     Not on file       Active Ambulatory Problems     Diagnosis Date Noted     Thyroid nodule 09/15/2017  "    Vitamin D deficiency 01/04/2018     Elevated sed rate 01/04/2018     External hemorrhoid 02/01/2018     Prediabetes 04/26/2018     Resolved Ambulatory Problems     Diagnosis Date Noted     No Resolved Ambulatory Problems     No Additional Past Medical History       No family history on file.    Objective:     /76   Pulse 90   Ht 5' 3\" (1.6 m)   Wt 188 lb 4.8 oz (85.4 kg)   SpO2 96%   BMI 33.36 kg/m      Patient is alert, in no obvious distress.   Skin: Warm, dry.    Lungs:  Clear to auscultation. Respirations even and unlabored.  No wheezing or rales noted.   Heart:  Regular rate and rhythm.  No murmurs.   Musculoskeletal: She has full range of motion of her bilateral upper and lower extremities.  She is able to squat without difficulty.  She is tender to palpation of her left mid scapular region.  Muscle strength of bilateral upper extremities +5/5 against resistance.    LABORATORY: I ordered and personally reviewed a left hip x-ray showing no obvious fracture.  Will have radiology review.                "

## 2021-06-02 NOTE — PROGRESS NOTES
[facility] Certification Request    October 28, 2019      Patient: Meera Grier  MR Number: [id number]  YOB: 1965  Date of Visit: 10/28/2019      Dear Allison Boyce, CNP:    Thank you for this referral.   We are seeing Meera Grier in Physical Therapy for Acute bilateral thoracic back pain.    Medicare and/or Medicaid requires physician review and approval of the treatment plan. Please review the plan of care and verify that you agree with the therapy plan of care by co-signing this note.      Plan of Care  Authorization / Certification Start Date: 10/28/19  Authorization / Certification End Date: 01/01/20  Authorization / Certification Number of Visits: 10  Communication with: Referral Source  Patient Related Instruction: Nature of Condition;Precautions;Next steps;Treatment plan and rationale;Expected outcome;Self Care instruction;Basis of treatment;Body mechanics;Posture  Times per Week: 2-1  Number of Weeks: 6-8  Number of Visits: 10  Discharge Planning: to include self management and HEP  Precautions / Restrictions : prediabetes  Therapeutic Exercise: ROM;Stretching;Strengthening  Neuromuscular Reeducation: posture;core  Manual Therapy: soft tissue mobilization;myofascial release;joint mobilization  Modalities: cold pack;hot pack;electrical stimulation      Goals:  Pt. will demonstrate/verbalize independence in self-management of condition in : 6 weeks  Pt. will be independent with home exercise program in : 6 weeks  Pt. will improve posture : and demonstrate posture with minimal to no cuing;for working;in sitting;in 6 weeks  Patient will stand : 30 minutes;with no pain;with less difficultty;for home chores;in 6 weeks  Patient will sit: 30 minutes;for work;with no pain;with less difficultty;in 6 weeks    No data recorded      If you have any questions or concerns, please don't hesitate to call.    Sincerely,      Felice Merrill, PT      Physician:    For  Medicare/MA patients:      Physician recommendation:     ___ Follow therapist's recommendation        ___ Modify therapy      *Physician co-signature indicates they certify the need for these services furnished within this plan and while under their care.         [facility]   Cervical Thoracic Initial Evaluation    Patient Name: Meera Grier  Date of evaluation: 10/28/2019  Referral Diagnosis: Acute bilateral thoracic back pain  Referring provider: Allison Carranza CNP  Visit Diagnosis:     ICD-10-CM    1. Acute left-sided thoracic back pain M54.6        Precautions / Restrictions : prediabetes       Assessment:      Impairments in  pain, posture, ROM, joint mobility, strength  Patient's signs and symptoms are consistent with postural syndrome with increased muscle spasm left>right thoracic paraspinals rhomboids, traps.  Patient responded well to manual therapy and HEP.  Prognosis to achieve goals is  good   Pt. is appropriate for skilled PT intervention as outlined in the Plan of Care (POC).    Goals:  Pt. will demonstrate/verbalize independence in self-management of condition in : 6 weeks  Pt. will be independent with home exercise program in : 6 weeks  Pt. will improve posture : and demonstrate posture with minimal to no cuing;for working;in sitting;in 6 weeks  Patient will stand : 30 minutes;with no pain;with less difficultty;for home chores;in 6 weeks  Patient will sit: 30 minutes;for work;with no pain;with less difficultty;in 6 weeks    No data recorded    Patient's expectations/goals are realistic.    Barriers to Learning or Achieving Goals:  No Barriers.       Plan / Patient Instructions:        Plan of Care:   Authorization / Certification Start Date: 10/28/19  Authorization / Certification End Date: 01/01/20  Authorization / Certification Number of Visits: 10  Communication with: Referral Source  Patient Related Instruction: Nature of Condition;Precautions;Next steps;Treatment plan and  rationale;Expected outcome;Self Care instruction;Basis of treatment;Body mechanics;Posture  Times per Week: 2-1  Number of Weeks: 6-8  Number of Visits: 10  Discharge Planning: to include self management and HEP  Precautions / Restrictions : prediabetes  Therapeutic Exercise: ROM;Stretching;Strengthening  Neuromuscular Reeducation: posture;core  Manual Therapy: soft tissue mobilization;myofascial release;joint mobilization  Modalities: cold pack;hot pack;electrical stimulation      POC and pathology of condition were reviewed with patient.  Pt. is in agreement with the Plan of Care  A Home Exercise Program (HEP) was initiated today.  Pt. was instructed in exercises by PT and patient was given a handout with detailed instructions.    Plan for next visit: review HEP, continue manual therapy, add band exercises for shoulders and core strengthening, body blade possibly     Subjective:           History of Present Illness:    Meera is a 53 y.o. female who presents to therapy today with complaints of mild to severe pain in her left>right upper back. Date of onset:  7/2019. Onset was gradual. Symptoms are getting better. She denies history of similar symptoms. She describes their previous level of function as not limited.    Pain Rating:3  Pain rating at best: 0  Pain rating at worst: 10  Pain description: aching, pain, sharp and soreness    Functional limitations are described as occurring with:   bending  lifting  reaching at shoulder height and overhead  sitting    work           Objective:      Note: Items left blank indicates the item was not performed or not indicated at the time of the evaluation.    Patient Outcome Measures :    Neck Disability Score in %: 8.89     Scores range from 0-100%, where a score of 0% represents minimal pain and maximal function. The minmal clinically important difference is a score reduction of 10%.    Cervical Thoracic Examination  1. Acute left-sided thoracic back pain        Precautions / Restrictions : prediabetes     Involved side: left>right  Posture Observation:      Cervical:  Moderate forward head  Shoulder/Thoracic complex: Moderate bilateral scapular protraction     Cervical ROM:    Date: 10/28/19      *Indicate scale AROM AROM AROM   Cervical Flexion WNL     Cervical Extension WNL      Right Left Right Left Right Left   Cervical Sidebending WNL WNL       Cervical Rotation WNL WNL       Cervical Protraction WNL     Cervical Retraction WNL     Thoracic Flexion Min loss     Thoracic Extension Mod loss     Thoracic Sidebending         Thoracic Rotation Min loss Min loss         Strength     Date: 10/28/19      Cervical Myotomes/5 Right Left Right Left Right Left   Cervical Flexion (C1-2) 5 5       Cervical Sidebending (C3) 5 5       Shoulder Elevation (C4) 5 5       Shoulder Abduction (C5) 5 5       Elbow Flexion (C6) 5 5       Elbow Extension (C7) 5 5       Wrist Flexion (C7) 5 5       Wrist Extension (C6) 5 5       Thumb abduction (C8) 5 5       Finger Abduction (T1) 5 5         Sensation   NT      Reflex Testing  NT  Cervical Dermatomes Right Left UE Reflexes Right Left   Back of the Head (C2)   Biceps (C5-6)     Supraclavicular Fossa (C3)   Brachioradialis (C5-6)     AC Joint (C4)   Triceps (C7-8)     Lateral Biceps (C5)   Blaine s test     Palmar Thumb (C6)   LE Reflexes     Palmar 3rd Finger (C7)   Patellar (L3-4)     Palmar 5th Finger (C8)   Achilles (S1-2)     Ulnar Forearm (T1)   Babinski Response         Flexibility & Palpation:  Tight and tender left>right thoracic paraspinals around T6-T10    Passive Mobility-Joint Integrity: Hypomobile.    Cervical Special Tests      Cervical Special Tests Right Left UE Nerve Mobility Right Left   Cervical compression   Median nerve     Cervical distraction   Ulnar nerve     Spurling s test   Radial nerve     Shoulder abduction sign   Thoracic outlet     Deep neck flexor endurance test   Jeannie     Upper cervical rotation   Adson s      Sharper-Josefina   Cervical rotation lateral flexion     Alar ligament test   Other:     Other:   Other:            Treatment Today     Therapeutic Exercises:  Exercise #1: seated thoracic flexion  Comment #1: 5  Exercise #2: seated thoracic extension  Comment #2: 5  Exercise #3: seated thoracic rotation  Comment #3: 5 x bilateral  Exercise #4: scapular retraction with depression  Comment #4: 10  Exercise #5: band ex. for shoulder, core exercises, body blade? for next visit.       Manual therapy:  MFR layers 1-2 bilateral thoracic paraspinals, middle trap, rhomboids, multifidi    TREATMENT MINUTES COMMENTS   Evaluation 20    Self-care/ Home management     Manual therapy 20    Neuromuscular Re-education     Therapeutic Activity     Therapeutic Exercises 10    Gait training     Modality__________________                Total 50    Blank areas are intentional and mean the treatment did not include these items.     PT Evaluation Code: (Please list factors)  Patient History/Comorbidities:   Patient Active Problem List   Diagnosis     Thyroid nodule     Vitamin D deficiency     Elevated sed rate     External hemorrhoid     Prediabetes    No past medical history on file.   Examination: as above  Clinical Presentation: stable  Clinical Decision Making: low complexity        Patient History/  Comorbidities Examination  (body structures and functions, activity limitations, and/or participation restrictions) Clinical Presentation Clinical Decision Making (Complexity)   No documented Comorbidities or personal factors 1-2 Elements Stable and/or uncomplicated Low   1-2 documented comorbidities or personal factor 3 Elements Evolving clinical presentation with changing characteristics Moderate   3-4 documented comorbidities or personal factors 4 or more Unstable and unpredictable High Felice Merrill, PT  10/28/2019  5:28 PM

## 2021-06-02 NOTE — TELEPHONE ENCOUNTER
----- Message from Allison Carranza CNP sent at 10/8/2019 12:53 PM CDT -----  Please notify the patient that her hip x-ray is normal.  Thanks.

## 2021-06-02 NOTE — TELEPHONE ENCOUNTER
Patient calling.  Says she is having pain in back on left upper side.  Says it hurts more when she moves.  Pain started about 1/2 hour ago.  Took ibuprofen and it did not help.  Rates pain 6/10.  Worse when she moves.    Patient able to stand and walk.  No bowel or bladder symptoms.  No abdominal pain.  No fever.    Triaged to disposition of See PCP When Office is Open.  Caller warm transferred to scheduling.  Scheduled for tomorrow.      Care advice given per protocol: Apply cold pack or ice bag wrapped in a moist towel to the sore muscles for 20 minutes four times a day for first 2 days.  After 2 days apply heating pad to area for 20 minutes whenever the pain flares up.  Sleep on your sie with pillow between knees, avoid sleeping on abdomen and mattress should be firm.  Continue activity as much as pain permits.  Avoid any activities that cause severe pain.  Ibuprofen or Tylenol for pain.    Advised patient to call back if numbness or weakness occurs, bowel or bladder problems occur, urine symptoms or fever occurs or she becomes worse.    Winnie Monreal RN  Triage Nurse Advisor    Reason for Disposition    [1] Age > 50 AND [2] no history of prior similar back pain    Protocols used: BACK PAIN-A-

## 2021-06-02 NOTE — TELEPHONE ENCOUNTER
Patient Returning Call  Reason for call:  Return call  Information relayed to patient:  Patient was informed of her Xray result below. Patient has no further questions or concerns at this time.  Patient has additional questions:  No  If YES, what are your questions/concerns:  N/a  Okay to leave a detailed message?: No call back needed

## 2021-06-03 VITALS
BODY MASS INDEX: 33.36 KG/M2 | OXYGEN SATURATION: 96 % | WEIGHT: 188.3 LBS | HEART RATE: 90 BPM | SYSTOLIC BLOOD PRESSURE: 116 MMHG | HEIGHT: 63 IN | DIASTOLIC BLOOD PRESSURE: 76 MMHG

## 2021-06-03 NOTE — PROGRESS NOTES
[facility] Discharge Summary  Patient Name: Meera Grier  Date: 2/4/2020  Referral Diagnosis: Acute bilateral thoracic back pain  Referring provider: Allison Carranza CNP  Visit Diagnosis:   1. Acute left-sided thoracic back pain         Goals:  No data recorded  No data recorded    Patient was seen for 2 visits physical therapy.    The patient attended therapy initially, but did not finish the therapy sessions prescribed.  Goals were not fully achieved. Explanation for goals not achieved: The patient discontinued therapy, did not return.    Therapy will be discontinued at this time.  Please see progress note dated 11/15/19 for patient status.      Thank you for your referral.  Felice Merrill, PT  2/4/2020  3:02 PM     [facility] Daily Progress     Patient Name: Meera Grier  Date: 11/15/2019  Visit #: 2  Authorization dates:  10/28/19-1/1/2020  Referral Diagnosis: Acute bilateral thoracic back pain  Referring provider: Allison Carranza CNP  Visit Diagnosis:     ICD-10-CM    1. Acute left-sided thoracic back pain M54.6        Precautions / Restrictions : prediabetes       Assessment:     Response to Intervention:  Tolerated manual therapy well.  Good tissue release with MFR.  Overall much less pain with PT.    Symptoms are consistent with:  Medical diagnosis  Patient is appropriate to continue with skilled physical therapy intervention, as indicated by initial plan of care.    Goal Status:  Pt. will demonstrate/verbalize independence in self-management of condition in : 6 weeks  Pt. will be independent with home exercise program in : 6 weeks  Pt. will improve posture : and demonstrate posture with minimal to no cuing;for working;in sitting;in 6 weeks  Patient will stand : 30 minutes;with no pain;with less difficultty;for home chores;in 6 weeks  Patient will sit: 30 minutes;for work;with no pain;with less difficultty;in 6 weeks    No data recorded  Other functional progress:            Plan / Patient Education:     Continue with initial plan of care.  Progress with home program as tolerated.       Subjective:     Pain Rating:  Resting 0  Activity:  0    Response to last treatment: sore a couple days after the last visit for one day  HEP- Frequency: 3x/week, Questions or difficulties:  none.    Patient reports:      Much less pain than before.    Some days are pain free.    Nothing she can determine is setting pain off or increasing it.      Objective:              Treatment Today   Manual Therapy  MFR layers 1-3 bilateral: thoracic paraspinals, lumbar paraspinals, trapezius, multifidus.    Exercises:  Exercise #1: seated thoracic flexion  Comment #1: 5  Exercise #2: seated thoracic extension  Comment #2: 5  Exercise #3: seated thoracic rotation  Comment #3: 5 x bilateral  Exercise #4: scapular retraction with depression  Comment #4: 10  Exercise #5: band ex. for shoulder, core exercises, body blade? for next visit.            TREATMENT MINUTES COMMENTS   Evaluation     Self-care/ Home management     Manual therapy 25 See above.   Neuromuscular Re-education     Therapeutic Activity     Therapeutic Exercises 5 verbal review of HEP and gym workouts, including core strengthening, rows, lat pull downs.    Gait training     Modality__________________                Total 30    Blank areas are intentional and mean the treatment did not include these items.       Felice Merrill, PT  11/15/2019

## 2021-06-04 VITALS
BODY MASS INDEX: 32.82 KG/M2 | WEIGHT: 185.3 LBS | DIASTOLIC BLOOD PRESSURE: 80 MMHG | HEART RATE: 71 BPM | OXYGEN SATURATION: 99 % | SYSTOLIC BLOOD PRESSURE: 120 MMHG

## 2021-06-05 VITALS
OXYGEN SATURATION: 98 % | DIASTOLIC BLOOD PRESSURE: 74 MMHG | SYSTOLIC BLOOD PRESSURE: 122 MMHG | HEART RATE: 81 BPM | BODY MASS INDEX: 33.27 KG/M2 | WEIGHT: 187.8 LBS

## 2021-06-05 VITALS
SYSTOLIC BLOOD PRESSURE: 102 MMHG | DIASTOLIC BLOOD PRESSURE: 62 MMHG | HEART RATE: 80 BPM | WEIGHT: 184.1 LBS | BODY MASS INDEX: 32.61 KG/M2

## 2021-06-06 NOTE — TELEPHONE ENCOUNTER
Left message to call back for: medication request  Information to relay to patient:  Reviewed 2/20/2020 office visit note and it doesn't appear a cough was discussed at that time.     Please determine why patient is in need of benzonatate?

## 2021-06-06 NOTE — TELEPHONE ENCOUNTER
Medication Request  Medication name:   benzonatate (TESSALON) 200 MG capsule 30 capsule 0 11/29/2018 12/9/2018 --   Sig - Route: Take 1 capsule (200 mg total) by mouth 3 (three) times a day as needed for cough. - Oral   Sent to pharmacy as: benzonatate (TESSALON) 200 MG capsule   E-Prescribing Status: Receipt confirmed by pharmacy (11/29/2018  5:20 PM CST       Requested Pharmacy: [id number]  Reason for request: needs new prescription please.  When did you use medication last?:  11/30/18  Patient offered appointment:  N/A - electronic request  Okay to leave a detailed message: no

## 2021-06-06 NOTE — TELEPHONE ENCOUNTER
Patient Returning Call  Reason for call:  Tessalon  Information relayed to patient:  The writer read the following to patient per Allison Carranza CNP:Reviewed 2/20/2020 office visit note and it doesn't appear a cough was discussed at that time.      Please determine why patient is in need of benzonatate? patient does not have a cough.  Pharmacy sent in refill but patient does not need it.    Patient has additional questions:  Yes  If YES, what are your questions/concerns:  What are the results of recent labs?  Does she need to supplement Vit D?  The writer read the following to patient per result letter by Allison Carranza dated 2/23/20:Your lab results are normal.    Writer confirmed no new orders at this time.      Okay to leave a detailed message?: No call back needed

## 2021-06-06 NOTE — TELEPHONE ENCOUNTER
----- Message from Allison Carranza CNP sent at 3/8/2020  7:51 PM CDT -----  Please notify the patient that she has a thyroid nodule which has increased in size. The radiologist recommends a biopsy.  I placed an order and our specialty  will be in contact with her to help her schedule the appointment.      Her pelvic ultrasound is normal. If her symptoms persist, I can refer her to an obgyn. Thanks.

## 2021-06-06 NOTE — TELEPHONE ENCOUNTER
Who is calling:  Patient    Reason for Call:  Requesting the contact information to schedule US Thyroid biopsy.    Date of last appointment with primary care: 02/20/2020    Okay to leave a detailed message: Yes    Please call patient with contact information.

## 2021-06-06 NOTE — TELEPHONE ENCOUNTER
Patient Returning Call  Reason for call:  Results   Information relayed to patient:    ----- Message from Allison Carranza CNP sent at 3/8/2020  7:51 PM CDT -----  Please notify the patient that she has a thyroid nodule which has increased in size. The radiologist recommends a biopsy.  I placed an order and our specialty  will be in contact with her to help her schedule the appointment.       Her pelvic ultrasound is normal. If her symptoms persist, I can refer her to an obgyn. Thanks.  Patient has additional questions:  No  If YES, what are your questions/concerns:  NA  Okay to leave a detailed message?: No call back needed

## 2021-06-06 NOTE — PROGRESS NOTES
Assessment and Plan:     1. Pelvic pain in female  Differentials include ovarian cysts, urinary tract infection, nephrolithiasis, endometrial hyperplasia, constipation, appendicitis, diverticulitis.  She is afebrile and does not appear acutely ill today.  She is having normal bowel movements and urination.  Will obtain pelvic ultrasound for further evaluation.  She is not interested in a urine pregnancy test or STD screening.  We will notify patient of wet prep results.  Urinalysis is unremarkable.  - US Pelvis With Transvaginal Non OB; Future  - Wet Prep, Vaginal    2. Lumbar pain  Differentials include myofascial pain, bulging or herniated disc, nephrolithiasis.  Urinalysis shows no hematuria.  Discussed symptomatic treatment continue rest, ice, stretching activities.  Will refer to PT for further evaluation.  - Urinalysis-UC if Indicated  - Ambulatory referral to PT/OT    3. Thyroid nodule  She has a history of thyroid nodules.  Will check thyroid cascade.  Will obtain thyroid ultrasound to determine stability.  - Thyroid [place]  - US Thyroid; Future    4. Vitamin D deficiency  We will check vitamin D and notify patient of results.  She is taking 2000 units twice weekly.  She is content with the plan.  - Vitamin D, Total (25-Hydroxy)    5. Visit for screening mammogram  - Mammo Screening Bilateral; Future        Subjective:     Meera is a 54 y.o. female presenting to the clinic for multiple concerns today.  Patient has been experiencing low back pain for 2 months.  She describes the pain as an intermittent throb within her bilateral lower lumbar region.  Patient states the pain does not radiate.  She denies numbness and tingling of her extremities.  She denies any fecal or urinary incontinence or retention.  She has been taking Advil.  Patient has also been experiencing bilateral lower pelvic pain for 2 weeks.  She describes the pain as an intermittent throb.  She feels as though nothing worsens the pain or  improves the pain.  She denies constipation or diarrhea.  Her last bowel movement was last night and was normal.  She denies any blood or mucus in the stool.  She has not had nausea or vomiting.  No fever has been present.  She denies dysuria, hematuria.  She has no concerns for STDs.  She has not had a menstrual period since last April.  She denies any vaginal discharge or irritation.  She has not tried any over-the-counter products for her symptoms.  She has a history of pelvic pain February 2019.  Ultrasound showed upper normal endometrial thickness.  She was referred to OB/GYN where an ultrasound was repeated and was normal.  She has a history of vitamin D deficiency and is taking 2000 units twice weekly.  Patient states she has more energy when she takes high-dose vitamin D.  Patient has a history of thyroid nodularities.  Last thyroid ultrasound was performed on 9/12/2017, suggesting benign etiology.  She did have an ultrasound-guided fine-needle aspiration biopsy which was negative for cancer.  Patient feels as though more nodularities have developed.    Review of Systems: A complete 14 point review of systems was obtained and is negative or as stated in the history of present illness.    Social History     Socioeconomic History     Marital status: Single     Spouse name: Not on file     Number of children: Not on file     Years of education: Not on file     Highest education level: Not on file   Occupational History     Not on file   Social Needs     Financial resource strain: Not on file     Food insecurity:     Worry: Not on file     Inability: Not on file     Transportation needs:     Medical: Not on file     Non-medical: Not on file   Tobacco Use     Smoking status: Never Smoker     Smokeless tobacco: Never Used   Substance and Sexual Activity     Alcohol use: No     Drug use: No     Sexual activity: Yes     Partners: Male     Comment: in relationship   Lifestyle     Physical activity:     Days per week:  Not on file     Minutes per session: Not on file     Stress: Not on file   Relationships     Social connections:     Talks on phone: Not on file     Gets together: Not on file     Attends Orthodox service: Not on file     Active member of club or organization: Not on file     Attends meetings of clubs or organizations: Not on file     Relationship status: Not on file     Intimate partner violence:     Fear of current or ex partner: Not on file     Emotionally abused: Not on file     Physically abused: Not on file     Forced sexual activity: Not on file   Other Topics Concern     Not on file   Social History Narrative     Not on file       Active Ambulatory Problems     Diagnosis Date Noted     Thyroid nodule 09/15/2017     Vitamin D deficiency 01/04/2018     Elevated sed rate 01/04/2018     External hemorrhoid 02/01/2018     Prediabetes 04/26/2018     Resolved Ambulatory Problems     Diagnosis Date Noted     No Resolved Ambulatory Problems     No Additional Past Medical History       No family history on file.    Objective:     /80 (Patient Site: Left Arm, Cuff Size: Adult Regular)   Pulse 71   Wt 185 lb 4.8 oz (84.1 kg)   SpO2 99%   BMI 32.82 kg/m      Patient is alert, in no obvious distress.   Skin: Warm, dry.  No lesions or rashes.  Skin turgor rapid return.   HEENT:  Head normocephalic, atraumatic.  Eyes normal. Ears normal.  Nose patent, mucosa pink.  Oropharynx mucosa pink.  No lesions or tonsillar enlargement.   Neck: Supple, no lymphadenopathy.  Thyroid is enlarged with multiple nodularities.   Lungs:  Clear to auscultation. Respirations even and unlabored.  No wheezing or rales noted.   Heart:  Regular rate and rhythm.  No murmurs, S3, S4, gallops, or rubs.    Abdomen: Soft, nontender.  No organomegaly. Bowel sounds normoactive. No guarding or masses noted.   Musculoskeletal: She has full range of motion of her extremities.  She is tender to palpation of her bilateral lower lumbar paraspinous  muscles.  She is able to heel and toe walk without difficulty.  Straight leg raise is negative bilaterally.  Patella and Achilles DTRs +2, symmetrical.    LABORATORY: Urinalysis ordered and is unremarkable.

## 2021-06-07 NOTE — TELEPHONE ENCOUNTER
Patient Returning Call  Reason for call:  Returning call  Information relayed to patient:    Relayed below message to patient.  Patient has additional questions:  No  If YES, what are your questions/concerns:    No additional questions at this time.  Okay to leave a detailed message?: No call back needed

## 2021-06-07 NOTE — TELEPHONE ENCOUNTER
----- Message from Cynthia Pryor CMA sent at 4/6/2020  9:09 AM CDT -----    ----- Message -----  From: Allison Carranza CNP  Sent: 4/3/2020   4:02 PM CDT  To: Allison Carranza Care Team Pool    Please notify the patient that her thyroid nodule is non-cancerous.  Thanks.

## 2021-06-11 NOTE — PROGRESS NOTES
Assessment and Plan:      1. Routine general medical examination at a health care facility  Discussed consuming a healthy diet and exercising.  Discussed importance of routine sunscreen.  Discussed adequate calcium and vitamin D intake.  She is due for mammogram.  She has had a colonoscopy within the last 2 years.  She believes she is up-to-date on vaccinations.  She was seen previously at [facility] in [place].  We will try to obtain records.   - Mammo Screening Bilateral; Future    2. Screening for STD (sexually transmitted disease)   Discussed safe sex practices.  Will notify patient of results.  - Chlamydia trachomatis & Neisseria gonorrhoeae, Amplified Detection  - HIV Antigen/Antibody Screening [place]  - Syphilis Screen, [place]    3. Obesity  The following high BMI interventions were performed this visit: exercise promotion: strength training, exercise promotion: stretching and nutrition therapy    4. Multiple thyroid nodules  Will check thyroid labs and notify the patient.  We will try to obtain records to see if she needs a repeat ultrasound.  - Thyroid Stimulating Hormone (TSH)  - T4, Free    5. Iron deficiency anemia  Will notify the patient of results and start iron supplementation if appropriate.  - Iron and Transferrin Iron Binding Capacity  - Ferritin  - Hemoglobin    6. Chest pain, unspecified type  7.  Abnormal EKG   Suspect patient's pain is related to costochondritis.  Discussed symptomatic treatment including rest and an anti-inflammatory. Due to abnormal EKG, will obtain stress echocardiogram for further evaluation.  Will check d-dimer and troponin.  Will wait for radiology report regarding chest x-ray.  Will obtain chest CT if nodule is present.  If symptoms persist or worsen, she is to follow-up.  She is content with the plan.  - Electrocardiogram Perform - Clinic  - XR Chest PA and Lateral; Future  - D-dimer, Quantitative      Subjective:     Meera is a 51 y.o. female presenting to the  clinic for a female physical.     LMP: 6/11/17 irregular   Hx of abnormal pap smear: yes, for multiple years. Multiple colposcopies.  Last pap smear: 2 years ago, normal at [facility]   Perform self-breast exams: none   Vaginal discharge or irritation: none   Sexually active: in relationship for 2 years  Contraception: tubal   Concerns for STDs: none   Previous pregnancies:four pregnancies, vaginal deliveries     Patient has multiple concerns today.  She has a history of iron deficiency anemia.  She has had normal endoscopies and colonoscopies.  She denies heavy menses.  She is not taking an iron supplement.  Patient also has multiple thyroid nodules present.  She did have an ultrasound and biopsy performed last year which were normal.  She is unsure of further follow-up is necessary. Patient would like to be checked for STDs.  She has been a relationship for 2 years.  She denies vaginal discharge or irritation.  Patient is also concerned of chest pain which occurred last Friday.  Patient states it lasted for 3 days.  She describes the pain as a constant ache.  States it was within her midsternal region.  She has been exercising and strength training.  She denies shortness of breath with exertion, edema, orthopnea, and syncope.  She describes the pain as a pressure.  She denies pain today.  She denies family history of acute myocardial infarction.  She is not taking any pain medications.  She feels as though nothing worsened the pain or helped the pain.  She denies any recent travel.  Her mother does have a history of DVT.    Review of systems:  I performed a 10 point review of systems.  All pertinent positives and negatives are noted in the HPI. All others are negative.     No Known Allergies    No current outpatient prescriptions on file prior to visit.     No current facility-administered medications on file prior to visit.        Social History     Social History     Marital status: Single     Spouse name:  N/A     Number of children: N/A     Years of education: N/A     Occupational History     Not on file.     Social History Main Topics     Smoking status: Not on file     Smokeless tobacco: Not on file     Alcohol use Not on file     Drug use: Not on file     Sexual activity: Not on file     Other Topics Concern     Not on file     Social History Narrative       No past medical history on file.    No family history on file.    No past surgical history on file.    Objective:     Vitals:    06/20/17 1323   BP: 110/62   Pulse: 76   SpO2: 98%       Patient is alert, no obvious distress.   Skin: Warm, dry.  No rashes or lesions. Skin turgor rapid return.   HEENT:  Eyes normal.  Ears normal.  Nose patent, mucosa pink.  Oropharynx mucosa pink, no lesions or tonsil enlargement.   Neck:  Supple, without lymphadenopathy, bruits, JVD. Her thyroid is enlarged with multiple nodules present bilaterally.   Lungs:  Clear to auscultation.  No wheezing, rales noted.  Respirations even and unlabored.   Heart:  Regular rate and rhythm.  No murmurs.   Breasts:  Normal.  No surrounding adenopathy.   Abdomen: Soft, nontender.  No organomegaly.  Bowel sounds normoactive.  No guarding or masses noted.   :  deferred  Musculoskeletal:  Full ROM of extremities.  Muscle strength equal +5/5.   Neurological:  Cranial nerves 2-12 intact.      I ordered and personally reviewed an EKG showing sinus bradycardia, possible left atrial enlargement.  Nonspecific t wave abnormality.    I ordered and personally reviewed a chest x-ray showing a possible pulmonary nodules within the right midlung.  Will have radiology review.

## 2021-06-12 NOTE — PROGRESS NOTES
Assessment and Plan:     1. Abdominal pain, left lower quadrant  Differentials include diverticulitis, colitis, constipation, urinary tract infection, vaginal infection, STDs.  Will check hemogram, CMP, urinalysis, wet prep.  Patient's pain has improved.  If labs are abnormal, will obtain CT scan or abdominal ultrasound.  If symptoms persist or worsen, she is to follow-up in urgent care or ER.  - HM2(CBC w/o Differential)  - Comprehensive Metabolic Panel  - Urinalysis-UC if Indicated  - Wet Prep, Vaginal    2. Thyroid nodule  We will check thyroid cascade and notify patient of results.  Her most recent biopsy was benign.  - Thyroid [place]    3. Allergic conjunctivitis, bilateral  We will check IgE respiratory panel.  Patient symptoms improved with using over-the-counter allergy eyedrops.  Provided prescription for Patanol eyedrops, use as directed.  If symptoms persist, suggest referral to ophthalmology.  - IgE Allergen Panel Respiratory with Total IgE  - olopatadine (PATANOL) 0.1 % ophthalmic solution; Administer 1 drop to both eyes 2 (two) times a day.  Dispense: 5 mL; Refill: 1    4. Allergic rhinitis, unspecified seasonality, unspecified trigger  Patient's symptoms have improved with Cetirizine.  We will check allergy panel.  She is content with the plan.  - IgE Allergen Panel Respiratory with Total IgE        Subjective:     Meera is a 54 y.o. female presenting to the clinic for concerns for abdominal pain.  Patient states 2 weeks ago, she developed left lower abdominal pain.  Patient states it was severe enough she would rate her pain a 10/10.  This lasted for 2 days.  She had difficulty touching her abdomen.  Patient states the pain gradually radiated to her right lower abdomen.  Since then, the pain has improved, but continues to be an intermittent ache throughout her lower abdomen.  Passing gas improves the pain.  She denies constipation or diarrhea.  Her last bowel movement was yesterday.  She has not  had any blood or mucus in her stool.  She denies nausea or vomiting.  Her last menstrual period was over 1 year ago.  She denies dysuria, hematuria, low back pain, urinary urgency, urinary frequency.  She has not had any vaginal discharge or irritation.  Patient requests allergy testing.  8 weeks ago, she noticed both her upper eyelids appeared swollen.  Her eyes were irritated and itchy.  She started taking over-the-counter cetirizine and using an over-the-counter allergy eyedrop and her symptoms have improved.    Review of Systems: A complete 14 point review of systems was obtained and is negative or as stated in the history of present illness.    Social History     Socioeconomic History     Marital status: Single     Spouse name: Not on file     Number of children: Not on file     Years of education: Not on file     Highest education level: Not on file   Occupational History     Not on file   Social Needs     Financial resource strain: Not on file     Food insecurity     Worry: Not on file     Inability: Not on file     Transportation needs     Medical: Not on file     Non-medical: Not on file   Tobacco Use     Smoking status: Never Smoker     Smokeless tobacco: Never Used   Substance and Sexual Activity     Alcohol use: No     Drug use: No     Sexual activity: Yes     Partners: Male     Comment: in relationship   Lifestyle     Physical activity     Days per week: Not on file     Minutes per session: Not on file     Stress: Not on file   Relationships     Social connections     Talks on phone: Not on file     Gets together: Not on file     Attends Hinduism service: Not on file     Active member of club or organization: Not on file     Attends meetings of clubs or organizations: Not on file     Relationship status: Not on file     Intimate partner violence     Fear of current or ex partner: Not on file     Emotionally abused: Not on file     Physically abused: Not on file     Forced sexual activity: Not on file    Other Topics Concern     Not on file   Social History Narrative     Not on file       Active Ambulatory Problems     Diagnosis Date Noted     Thyroid nodule 09/15/2017     Vitamin D deficiency 01/04/2018     Elevated sed rate 01/04/2018     External hemorrhoid 02/01/2018     Prediabetes 04/26/2018     Resolved Ambulatory Problems     Diagnosis Date Noted     No Resolved Ambulatory Problems     No Additional Past Medical History       No family history on file.    Objective:     /74 (Patient Site: Left Arm, Patient Position: Sitting, Cuff Size: Adult Regular)   Pulse 81   Wt 187 lb 12.8 oz (85.2 kg)   SpO2 98%   BMI 33.27 kg/m      Patient is alert, in no obvious distress.   Skin: Warm, dry.   Neck: Supple, no lymphadenopathy.  Thyroid is enlarged.   Lungs:  Clear to auscultation. Respirations even and unlabored.  No wheezing or rales noted.   Heart:  Regular rate and rhythm.  No murmurs, S3, S4, gallops, or rubs.    Abdomen: Soft, nontender.  No organomegaly. Bowel sounds normoactive. No guarding or masses noted.

## 2021-06-12 NOTE — TELEPHONE ENCOUNTER
----- Message from Allison Carranza CNP sent at 10/22/2020  7:10 PM CDT -----  Please notify the patient that her CT scan showed no kidney stones.  There is no cause for her pain.  Thanks.

## 2021-06-12 NOTE — TELEPHONE ENCOUNTER
Patient Returning Call  Reason for call:  Patient calling back  Information relayed to patient:  Relayed below message from the provider.  Patient has additional questions:  Yes  If YES, what are your questions/concerns:  Patient would like a referral to [facility]. Reports still having abdominal pain but not as severe.  Okay to leave a detailed message?: Yes

## 2021-06-12 NOTE — TELEPHONE ENCOUNTER
----- Message from Allison Carranza CNP sent at 10/21/2020  2:30 PM CDT -----  Please notify the patient that her wet prep is showing a vaginal bacterial infection.  I sent a prescription to the pharmacy for Metronidazole 500 mg twice daily for 7 days.  She should avoid consuming alcohol with this medication.  Secondly, her urine is showing blood.  This can be concerning for kidney stones.  I placed an order for a CT scan for further evaluation.  Thanks.

## 2021-06-12 NOTE — TELEPHONE ENCOUNTER
----- Message from Allison Carranza CNP sent at 10/28/2020  7:50 PM CDT -----  Please notify the patient that the rest of her labs are normal including her allergy panel.  If her abdominal pain persists, I can refer her to gastroenterology for further evaluation.  Thanks.

## 2021-06-12 NOTE — PROGRESS NOTES
Assessment and Plan:     1. Multinodular goiter (nontoxic)  US Thyroid   2. Hoarseness of voice  Ambulatory referral to ENT     We will obtain thyroid ultrasound to to determine stability of thyroid nodules.  If there are any changes, will refer to endocrinology.  Discussed differentials for hoarseness of her voice including acid reflux, vocal cord dysfunction, postnasal drainage from allergies.  Patient would like another opinion from an ear nose and throat specialist.  Provide a referral.  She is content with the plan.    Subjective:     Meera is a 51 y.o. female presenting to the clinic for concerns regarding a multinodular goiter.  Patient was seen previously at [facility].  She had an ultrasound due to an enlarged thyroid and voice hoarseness on 6/2/16.  Findings included a multinodular goiter and multiple indeterminate bilateral thyroid nodules including a dominant 3.7 cm nodule in the inferior pole right thyroid lobe.  Patient states she had a biopsy of the large nodule which was normal.  Patient saw an endocrinologist through [facility] on 9/7/16 who recommended a repeat ultrasound in December to make sure the smaller nodules were not increased in size.  Patient did not follow through with this.  She did see an ear nose and throat specialist for the voice hoarseness and it was suspected that she had vocal cord dysfunction.  She was referred to speech therapist but did not see the therapist.  Thyroid antibodies were negative. Last TSH was .88 and T4 free 1.0 on 6/20/17  Patient states the voice hoarseness did improve on its own but has returned over the past month.  She denies acid reflux symptoms.  She has not had allergy symptoms including rhinorrhea and postnasal drainage.    Review of Systems: A complete 14 point review of systems was obtained and is negative or as stated in the history of present illness.    Social History     Social History     Marital status: Single     Spouse name: N/A     Number of  "children: N/A     Years of education: N/A     Occupational History     Not on file.     Social History Main Topics     Smoking status: Never Smoker     Smokeless tobacco: Not on file     Alcohol use No     Drug use: No     Sexual activity: Yes     Partners: Male      Comment: in relationship     Other Topics Concern     Not on file     Social History Narrative       Active Ambulatory Problems     Diagnosis Date Noted     No Active Ambulatory Problems     Resolved Ambulatory Problems     Diagnosis Date Noted     No Resolved Ambulatory Problems     No Additional Past Medical History       Family History   Problem Relation Age of Onset     No Medical Problems Mother      No Medical Problems Father        Objective:     /80 (Patient Site: Left Arm, Patient Position: Sitting, Cuff Size: Adult Regular)  Pulse 88  Ht 5' 3\" (1.6 m)  Wt 175 lb 4.8 oz (79.5 kg)  SpO2 97%  BMI 31.05 kg/m2    Patient is alert, in no obvious distress.   Skin: Warm, dry.  No lesions or rashes.  Skin turgor rapid return.   HEENT:  Head normocephalic, atraumatic.  Eyes normal. Ears normal.  Nose patent, mucosa pink.  Oropharynx mucosa pink.  No lesions or tonsillar enlargement.   Neck: Supple, no lymphadenopathy.  Thyroid is enlarged and feels more prominent within the right side.   Lungs:  Clear to auscultation. Respirations even and unlabored.  No wheezing or rales noted.   Heart:  Regular rate and rhythm.  No murmurs.               "

## 2021-06-14 NOTE — PROGRESS NOTES
HPI: This patient is a 51yo F who presents for evaluation of her voice. She has had a persistent raspiness for over a year now and has seen an ENT in the past. Nothing was reportedly found and she went to speech therapy who recommended voice rest. She was able to do this for a short period of time and reports that it was helping some. Denies fevers, otalgia, weight loss, odynophagia, dysphagia, hemoptysis, and shortness of breath. Does not complain of frequent sour taste, heartburn, or burping. Is not taking reflux medication.    Past medical history, surgical history, social history, family history, medications, and allergies have been reviewed with the patient and are documented above.    Review of Systems: a 10-system review was performed. Pertinent positives are noted in the HPI and on a separate scanned document in the chart.    PHYSICAL EXAMINATION:  GEN: no acute distress, normocephalic  EYES: extraocular movements are intact, pupils are equal and round. Sclera clear.   EARS: auricles are normally formed. The external auditory canals are clear with minimal to no cerumen. Tympanic membranes are intact bilaterally with no signs of infection, effusion, retractions, or perforations.  NOSE: anterior nares are patent. There are no masses or lesions. The septum is non-obstructing.  OC/OP: clear, dentition is in good repair. The tongue and palate are fully mobile and symmetric. The floor of mouth, base of tongue, and tonsils are soft and symmetric. Cobblestoning of the posterior pharyngeal wall.  HP/L (scope): nasopharynx, base of tongue, vallecula, epiglottis, and pyriform sinuses are clear. The bilateral vocal folds are mobile and without lesions, but it appears that she may have a small left anterior fold cyst. There is interarytenoid edema and erythema.  NECK: soft and supple. No lymphadenopay. Enlarged, multinodular goiter appreciated.  Airway is midline.  NEURO: CN II-XII are intact bilaterally. alert and  oriented x 3. No nystagmus. Gait is normal.  PULM: breathing comfortably on room air, normal chest expansion with respiration     THYROID U/S: MNG  THYROID FNA: colloid nodule    MEDICAL DECISION-MAKING: This patient is a 51yo F with chronic laryngitis from acid reflux but may also have a small left vocal cyst. Discussed diet and lifestyle changes, including weight loss, in addition to risks and benefits of H2 blocker vs PPI therapy. Also discussed an MSDL to remove the cyst. She does not wish to pursue surgery at this point and will try medications to mitigate reflux. Also has MNG, defer to endocrinology and patient wishes.

## 2021-06-15 NOTE — PROGRESS NOTES
Assessment and Plan:     1. Fatigue, unspecified type  We will rule out anemia, diabetes, thyroid disease, vitamin deficiencies.  Discussed good sleep hygiene.  Patient does not want a medication to assist with sleep.  Discussed decreasing stress.  If symptoms persist or worsen, may consider sleep study for further evaluation.  She denies symptoms of autoimmune disease.  She has not had any recent tick bites.  May consider TYRESE, RA, and Lyme's testing if symptoms persist.  She is content with the plan.  - HM2(CBC w/o Differential)  - Comprehensive Metabolic Panel  - Thyroid Cascade  - Vitamin D, Total (25-Hydroxy)  - Vitamin B12  - Sedimentation Rate  - Iron and Transferrin Iron Binding Capacity    Subjective:     Meera is a 52 y.o. female presenting to the clinic for concerns for fatigue.  Patient states over the past month, she has had difficulty staying awake during the day.  She has been taking a multivitamin daily.  She does have a history of iron deficiency anemia and vitamin D deficiency.  Patient states she slept for most of the day yesterday.  She does wake up 2-3 times per night.  She admits to added stress.  She feels as though she has a regular sleep pattern.  She does not snore.  She denies any joint pain or body aches.  She has not had any recent cold symptoms.  She denies any tick bites.  States her periods are irregular occurring every 2-3 months.  Her periods last for 7 days and are not heavy.  She exercises 3 times per week and is trying to eat healthy.  She denies any recent travel.    Review of Systems: A complete 14 point review of systems was obtained and is negative or as stated in the history of present illness.    Social History     Social History     Marital status: Single     Spouse name: N/A     Number of children: N/A     Years of education: N/A     Occupational History     Not on file.     Social History Main Topics     Smoking status: Never Smoker     Smokeless tobacco: Never Used  "    Alcohol use No     Drug use: No     Sexual activity: Yes     Partners: Male      Comment: in relationship     Other Topics Concern     Not on file     Social History Narrative       Active Ambulatory Problems     Diagnosis Date Noted     Thyroid nodule 09/15/2017     Resolved Ambulatory Problems     Diagnosis Date Noted     No Resolved Ambulatory Problems     No Additional Past Medical History       Family History   Problem Relation Age of Onset     No Medical Problems Mother      No Medical Problems Father        Objective:     /72  Pulse (!) 102  Ht 5' 3\" (1.6 m)  Wt 179 lb 1.6 oz (81.2 kg)  BMI 31.73 kg/m2    Patient is alert, in no obvious distress.   Skin: Warm, dry.  No lesions or rashes.  Skin turgor rapid return.   HEENT:  Head normocephalic, atraumatic.  Eyes normal. Ears normal.  Nose patent, mucosa pink.  Oropharynx mucosa pink.  No lesions or tonsillar enlargement.   Neck: Supple, no lymphadenopathy, multinodular goiter is present.   Lungs:  Clear to auscultation. Respirations even and unlabored.  No wheezing or rales noted.   Heart:  Regular rate and rhythm.  No murmurs, S3, S4, gallops, or rubs.    Abdomen: Soft, nontender.  No organomegaly. Bowel sounds normoactive. No guarding or masses noted.   Musculoskeletal:  No edema present in bilateral lower extremities.                 "

## 2021-06-15 NOTE — PROGRESS NOTES
Assessment & Plan     Meera was seen today for back pain.    Diagnoses and all orders for this visit:    Mid back pain on right side  -     XR Chest 2 Views  -     XR Thoracic Spine 2 VWS  -X-rays reviewed with patient.  No significant findings present to explain her pain.  Pain at this point has resolved.  Suspect musculoskeletal etiology.  No further evaluation or treatment recommended unless pain returns.  Provided reassurance to patient.  Follow-up as needed    Visit for screening mammogram  -     Mammo Screening Bilateral; Future    Microscopic hematuria  -     Urinalysis-UC if Indicated  -     Culture, Urine  -Noted history of microscopic hematuria in October.  Subsequently had CT scan which showed no evidence of kidney stones.  Will recheck urine today to evaluate for persistence of hematuria.  If persistent, would recommend she see urologist.                 No follow-ups on file.    Natasha Navarro MD  [facility]   Meera Grier is 55 y.o. and presents today for the following health issues   HPI   She is here today with concern about pain in the right mid back area.  This occurred on Wednesday.  She had done some exercises that morning.  States that she was doing squats and not any exercises where she could have strained her back.  Developed pain in the right mid back area.  She reports this was constant and very uncomfortable throughout the day.  It was not worse with certain activities.  She describes it as just a pain.  It did not affect her breathing.  She did not have any other associated symptoms.  Did not have fevers or chills.  No nausea or vomiting.  No urinary symptoms.  She does have some chronic abdominal pain and she is working with her PCP on this and does plan to schedule an appointment with a GI specialist.  She reports that there has been no changes in that pain.  She has not noticed any rash.  She did not take anything for the pain and did  not use ice or heat.  States that the pain eventually went away on its own.  She did not have the pain yesterday and she did not have the pain today.  She just wants to make sure everything is okay as she is over age 50.  No other concerns or questions.  Medications and allergies are reviewed and updated.          Review of Systems   All other systems reviewed and are negative.        Objective    /62 (Patient Site: Right Arm, Patient Position: Sitting, Cuff Size: Adult Regular)   Pulse 80   Wt 184 lb 1.6 oz (83.5 kg)   BMI 32.61 kg/m    Body mass index is 32.61 kg/m .  Physical Exam   Constitutional: She is oriented to person, place, and time. She appears well-developed and well-nourished.   Cardiovascular: Normal rate, regular rhythm and normal heart sounds.   Pulmonary/Chest: Breath sounds normal. No respiratory distress. She has no wheezes.   Neurological: She is alert and oriented to person, place, and time.   Skin: no rashes  MSK: Indicates area of discomfort was in right mid back area.  No tenderness present in this area.  Full range of motion in both arms          Results: X-ray of the chest and thoracic spine performed in clinic.  These were personally reviewed.  No significant abnormalities noted on imaging.

## 2021-06-15 NOTE — TELEPHONE ENCOUNTER
Left message to call back for: reslts  Information to relay to patient:  Ok to discuss results upon return call.

## 2021-06-15 NOTE — TELEPHONE ENCOUNTER
Reason contacted:  Results   Information relayed:  Below message relayed to the patient. She was transferred to radiology to schedule her mammogram.  Additional questions:  No  Further follow-up needed:  No  Okay to leave a detailed message:  No

## 2021-06-15 NOTE — TELEPHONE ENCOUNTER
----- Message from Natasha Navarro MD sent at 3/5/2021  9:52 AM CST -----  Please let patient know that there was no blood seen on her urine sample today in clinic.

## 2021-06-15 NOTE — PROGRESS NOTES
Progress Note    Reason for Visit:  Chief Complaint     thyroid goiter          Progress Note:    HPI:  This pleasant 52-year-old female patient seen in consultation at the request of the primary care physician because of multinodular goiter.    The patient is aware of thyroid nodule for several years.    She had to ultrasound one year apart.    There are 2 nodules on the right side the upper 1 increased from 2.1-2.2 lower one is remaining stable at 3.7.    2 nodules on the left side 1 decreased from 2-2 0.1 actually increased.    Another one increased from 1.6-1.7.    The patient mother had thyroid disease she is denying family history of thyroid cancer or head or neck irradiation as a child.  She is not taking any iodine supplements.    She is not currently on any medication.    She has no difficulty swallowing.    TSH tends to run low at 0.74.  Component      Latest Ref Rng & Units 6/20/2017 1/2/2018   Creatinine      0.60 - 1.10 mg/dL  0.75   GFR MDRD Af Amer      >60 mL/min/1.73m2  >60   GFR MDRD Non Af Amer      >60 mL/min/1.73m2  >60   Bilirubin, Total      0.0 - 1.0 mg/dL  0.3   Calcium      8.5 - 10.5 mg/dL  9.2   Protein, Total      6.0 - 8.0 g/dL  7.4   ALBUMIN      3.5 - 5.0 g/dL  3.4 (L)   Alkaline Phosphatase      45 - 120 U/L  97   AST      0 - 40 U/L  18   ALT      0 - 45 U/L  14   TSH      0.30 - 5.00 uIU/mL 0.88 0.74   Free T4      0.7 - 1.8 ng/dL 1.0    Vitamin D, Total (25-Hydroxy)      30.0 - 80.0 ng/mL  16.0 (L)   Vitamin B-12      213 - 816 pg/mL  469       Final Diagnosis   LEFT THYROID NODULE, FINE NEEDLE ASPIRATION WITH CELL BLOCK PREPARATION:     - CONSISTENT WITH BENIGN THYROID NODULE              INDICATION: multinodular goiter; assess for changes in size of nodules  TECHNIQUE: Routine.  COMPARISON: Outside ultrasound 6/2/2016.     FINDINGS:  Right thyroid lobe measures 7 x 2.5 x 2.4 cm. Stable 2.1 x 1.9 x 1.5 cm upper pole nodule previously measured 2.2 x 2.1 x 1.7 cm. Stable lower pole  nodule measures 3.7 x 2.6 x 2.5 cm.      Left thyroid lobe measures 6.3 x 2.1 x 1.9 cm. Slightly larger upper pole 2.1 x 2 x 1.5 cm nodule previously measured 2 x 1.7 x 1.5 cm. No significant change in the mid pole 1.7 x 1.6 x 1.3 cm nodule which previously measured 1.6 x 1.5 x 1.3 cm.      Isthmus measures 3 mm.         No cervical lymphadenopathy.     IMPRESSION:   CONCLUSION:  1.  The right thyroid 2.1 and 3.7 cm nodules are stable suggesting a benign etiology.  2.  Slightly larger left upper pole nodule. Statistically, this is also likely benign, however because of enlargement consider FNA.    Review of Systems:    Nervous System: No headache, dizziness, fainting or memory loss. No tingling sensation of hand or feet.  Ears: No hearing loss or ringing in the ears  Eyes: No blurring of vision, redness, itching or dryness.  Nose: No nosebleed or loss of smell  Mouth: No mouth sores or loss of taste  Throat: No hoarseness or difficulty swallowing  Neck: No enlarged thyroid or lymph nodes.  Heart: No chest pain, palpitation or irregular heartbeat. No swelling of hands or feet  Lungs: No shortness of breath, cough, night sweats, wheezing or hemoptysis.  Gastrointestinal: No nausea or vomiting, constipation or diarrhea.  No acid reflux, abdominal pain or blood in stools.  Kidney/Bladdr: No polyuria, polydipsia, nocturia or hematuria.  Genital/Sexual: No loss of libido  Skin: No rash, hair loss or hirsutism.  No abnormal striae  Muscles/Joints/Bones: No morning stiffness, muscle aches and pain or loss of height.    Current Medications:  Current Outpatient Prescriptions   Medication Sig     multivitamin therapeutic tablet Take 1 tablet by mouth daily.       Patients Active Problems:  Patient Active Problem List   Diagnosis     Thyroid nodule     Vitamin D deficiency     Elevated sed rate       History:   reports that she has never smoked. She has never used smokeless tobacco. She reports that she does not drink alcohol  "or use illicit drugs.   reports that she has never smoked. She has never used smokeless tobacco. She reports that she does not drink alcohol or use illicit drugs.  History   Smoking Status     Never Smoker   Smokeless Tobacco     Never Used      reports that she has never smoked. She has never used smokeless tobacco. She reports that she does not drink alcohol or use illicit drugs.  History   Sexual Activity     Sexual activity: Yes     Partners: Male     Comment: in relationship     No past medical history on file.  Family History   Problem Relation Age of Onset     No Medical Problems Mother      No Medical Problems Father      No past medical history on file.  Past Surgical History:   Procedure Laterality Date     TUBAL LIGATION         Vitals   height is 5' 3\" (1.6 m) and weight is 173 lb (78.5 kg). Her blood pressure is 100/60.         Exam  General appearance: The patient looked well, not in acute distress.  Eyes: no evidence of thyroid eye disease.   Retinal exam: No evidence of diabetic retinopathy.  Mouth and Throat: Normal  Neck: No evidence of thyromegaly, enlarged lymph node or tenderness  Chest: Trachea is central. Chest is clear to auscultation and percussion. Breat sounds are normal.  Cardiovascular exam: JVP is not raised. Heart sounds are normal, no murmurs or rub  Peripheral pulses are palpable.   Abdomen: No masses or tenderness.    Back: No vertebral tenderness or kyphosis.  Extremities: No evidence of leg edema.   Skin: Normal to touch.  No abnormal striae  Neurologic exam:  Visual fields are intact by confrontation, grossly intact. No evidence of peripheral neuropathy.  Detailed foot exam normal.        Diagnosis:  No diagnosis found.    Orders:   No orders of the defined types were placed in this encounter.        Assessment and Plan: Toxic multinodular goiter TSH at the lower limits of normal and the patient had has heat intolerance which most likely due to over activity of 1 of these nodules " I discussed the pathophysiology of the disease with the patient they are benign and I told her we could put her on Tapazole 2.5 mg once a day but the patient does not want to be on any medication.    She has no local neck symptoms so she does not also want any surgery we will see her again in 1 year with repeat thyroid function test and thyroid ultrasound.    Vitamin D deficiency 8016 I would put her on 50,000 units once a week.    Patient is not  has 4 children does not smoke or drink.    I did spend 60 minutes with the patient more than 50% was spent on counseling and managing her care.

## 2021-06-16 PROBLEM — E04.1 THYROID NODULE: Status: ACTIVE | Noted: 2017-09-15

## 2021-06-16 PROBLEM — R70.0 ELEVATED SED RATE: Status: ACTIVE | Noted: 2018-01-04

## 2021-06-16 PROBLEM — R73.03 PREDIABETES: Status: ACTIVE | Noted: 2018-04-26

## 2021-06-16 PROBLEM — E55.9 VITAMIN D DEFICIENCY: Status: ACTIVE | Noted: 2018-01-04

## 2021-06-16 PROBLEM — K64.4 EXTERNAL HEMORRHOID: Status: ACTIVE | Noted: 2018-02-01

## 2021-06-16 NOTE — PROGRESS NOTES
Assessment and Plan:     1. Acute pain of left knee  Suspect patellofemoral syndrome from overuse.  Discussed avoidance of exercise machines for her legs at the gym.  Discussed symptomatic treatment including rest, ice, elevation, and ibuprofen.  Discussed foam rolling her lower extremities.  If no improvement in symptoms, may consider physical therapy or MRI for further evaluation.  She is content with the plan.  - XR Knee Left 1 or 2 VWS; Future    Subjective:     Meera is a 52 y.o. female presenting to the clinic for concerns for left knee pain for 3 months.  Patient denies any injury.  She has been exercising by using weight machines and performing squats.  Patient states squatting is exacerbating her symptoms.  Rest provides assistance.  She describes the pain as an intermittent ache around her patella.  She does have some pain within the posterior knee as well.  She has difficulty fully flexing her her knee.  She has not been taking any pain medication.  She denies history of gout.  She has not noticed any redness, swelling, and bruising.    Review of Systems: A complete 14 point review of systems was obtained and is negative or as stated in the history of present illness.    Social History     Social History     Marital status: Single     Spouse name: N/A     Number of children: N/A     Years of education: N/A     Occupational History     Not on file.     Social History Main Topics     Smoking status: Never Smoker     Smokeless tobacco: Never Used     Alcohol use No     Drug use: No     Sexual activity: Yes     Partners: Male      Comment: in relationship     Other Topics Concern     Not on file     Social History Narrative       Active Ambulatory Problems     Diagnosis Date Noted     Thyroid nodule 09/15/2017     Vitamin D deficiency 01/04/2018     Elevated sed rate 01/04/2018     External hemorrhoid 02/01/2018     Resolved Ambulatory Problems     Diagnosis Date Noted     No Resolved Ambulatory Problems  "    No Additional Past Medical History       Family History   Problem Relation Age of Onset     No Medical Problems Mother      No Medical Problems Father        Objective:     /68  Pulse 100  Ht 5' 3\" (1.6 m)  Wt 175 lb 6.4 oz (79.6 kg)  BMI 31.07 kg/m2    Patient is alert, in no obvious distress.   Skin: Warm, dry.   Musculoskeletal: She has full range of motion of the knee.  There are no areas of erythema, ecchymosis, signs of trauma.  Valgus, varus, Lachman's, Christelle's are all negative.  She ambulates without difficulty.  Minimal crepitus is palpated upon extension.    LABORATORY: I ordered and personally reviewed a left knee x-ray showing no obvious fracture.  Will have radiology review.                "

## 2021-06-17 NOTE — PROGRESS NOTES
Assessment and Plan:     1. Binge eating  We will check thyroid cascade and rule out diabetes.  Will refer to [facility] for further evaluation and treatment of binge eating disorder.  - Thyroid Cascade  - Glycosylated Hemoglobin A1c  - Ambulatory referral to Psychology    2. Iron deficiency anemia, unspecified iron deficiency anemia type  She is not currently taking an iron supplement.  Will notify patient of results.  - Iron and Transferrin Iron Binding Capacity  - Ferritin    3. Vitamin D deficiency  She is not currently taking vitamin D supplementation.  Will notify patient of results.  - Vitamin D, Total (25-Hydroxy)    4. Toe pain, left  Patient may be experiencing some nerve pain.  I do not visualize abnormalities.  She declines x-ray today.  Will refer to podiatry.  - Ambulatory referral to Podiatry    5. Carpal tunnel syndrome of left wrist  Patient plans on buying an over-the-counter wrist brace.  Will refer to orthopedics.  She is content with the plan.  - Ambulatory referral to Orthopedics    I spent 25 minutes with the patient with greater than 50% spent discussing symptoms, treatment options, and coordination of care.       Subjective:     Meera is a 52 y.o. female presenting to the clinic for her multiple concerns today.  Patient feels as though she is over eating.  This is been ongoing over the past 7-8 months.  Patient states she will go out to eat and then come home and want to keep eating.  She is also waking up in the middle the night to eat.  Patient states she is not hungry.  She does not vomit after eating.  She has not noticed any weight gain.  Patient states she is craving food.  She denies any increase in stress.  She does have a history of thyroid nodules.  She saw Dr. Agudelo who had discussed medication due to the possibility of a nodule being overactive.  Patient has a history of iron deficiency and vitamin D deficiency.  Patient is concerned of a stabbing pain within her left  "foot large toe.  She has a history of this in the past.  States this tends to flare when she is iron deficient.  She has not been taking an iron supplement.  She is taking a multivitamin 2 times per week.  She has a history of vitamin D deficiency and is not currently taking vitamin D supplementation as well.  Over the past few months, she is waking up with numbness and tingling within her left hand.  She was seen in the past and diagnosed with mild carpal tunnel syndrome.  She has a history of typing but her current job does not involve that.  She is working as a .    Review of Systems: A complete 14 point review of systems was obtained and is negative or as stated in the history of present illness.    Social History     Social History     Marital status: Single     Spouse name: N/A     Number of children: N/A     Years of education: N/A     Occupational History     Not on file.     Social History Main Topics     Smoking status: Never Smoker     Smokeless tobacco: Never Used     Alcohol use No     Drug use: No     Sexual activity: Yes     Partners: Male      Comment: in relationship     Other Topics Concern     Not on file     Social History Narrative       Active Ambulatory Problems     Diagnosis Date Noted     Thyroid nodule 09/15/2017     Vitamin D deficiency 01/04/2018     Elevated sed rate 01/04/2018     External hemorrhoid 02/01/2018     Resolved Ambulatory Problems     Diagnosis Date Noted     No Resolved Ambulatory Problems     No Additional Past Medical History       Family History   Problem Relation Age of Onset     No Medical Problems Mother      No Medical Problems Father        Objective:     /68  Pulse 80  Ht 5' 3\" (1.6 m)  Wt 175 lb 8 oz (79.6 kg)  BMI 31.09 kg/m2    Patient is alert, in no obvious distress.   Skin: Warm, dry.  No lesions or rashes.  Skin turgor rapid return.   HEENT:  Head normocephalic, atraumatic.  Eyes normal.  PERRL.  EOM's intact.  No nystagmus. Ears normal.  " Nose patent, mucosa pink.  Oropharynx mucosa pink.  No lesions or tonsillar enlargement.   Neck: Supple, no lymphadenopathy. No thyromegaly.  Lungs:  Clear to auscultation. Respirations even and unlabored.  No wheezing or rales noted.   Heart:  Regular rate and rhythm.  No murmurs, S3, S4, gallops, or rubs.    Musculoskeletal:  She ambulates without difficulty.  She has full ROM of her left foot, large toe. Her toe is non-tender to palpation.  Pedal pulses present and palpable.  She has full ROM of her left wrist.  Tinel's is positive.

## 2021-06-17 NOTE — TELEPHONE ENCOUNTER
Telephone Encounter by Lindsay Valentin LPN at 10/26/2020  8:22 AM     Author: Lindsay Valentin LPN Service: -- Author Type: Licensed Nurse    Filed: 10/26/2020  8:23 AM Encounter Date: 10/23/2020 Status: Signed    : Lindsay Valentin LPN (Licensed Nurse)       Patient Returning Call  Reason for call:  Patient returning call  Information relayed to patient:  Olesya Wilson CMA         8:10 AM  Note     ----- Message from Allison Carranza CNP sent at 10/22/2020  7:10 PM CDT -----  Please notify the patient that her CT scan showed no kidney stones.  There is no cause for her pain.  Thanks.         Patient has additional questions:  No  If YES, what are your questions/concerns:  Patient verbalized understanding above message .  Okay to leave a detailed message?: No

## 2021-06-18 NOTE — PROGRESS NOTES
Admission History & Physical  Meera Grier, 1965, [id number]    [facility]  Allison Carranza, CNP, [phone]    Extended Emergency Contact Information  Primary Emergency Contact: Ashley Grier  Address: [address] [place] [address] [facility]  Home Phone: [phone]  Relation: Child     Assessment and Plan:   Assessment: Neuritis bilateral feet  Plan: The patient was instructed to continue her exercise program.  She is to return to the clinic if her symptoms progress.  Active Problems:    * No active hospital problems. *      Chief Complaint:  Tingling both great toes     HPI:    Meera Grier is a 52 y.o. old female who presented to the clinic today complaining of tingling involving both great toes.  The patient stated that she has been exercising 3-4 days a week for several years.  She does not recall any particular trauma to her feet.  She has not had any other symptoms.  She has not had any redness or swelling involving her feet.  Symptoms are mild.  She is able to exercise and wear normal shoes without severe pain.  He denies any other previous treatment.  She states she stated that her vitamin D was low when she is currently taken supplemental vitamin D.  History is provided by patient    Medical History  Active Ambulatory (Non-Hospital) Problems    Diagnosis     Prediabetes     External hemorrhoid     Vitamin D deficiency     Elevated sed rate     Thyroid nodule     History reviewed. No pertinent past medical history.  Patient Active Problem List    Diagnosis Date Noted     Prediabetes 04/26/2018     External hemorrhoid 02/01/2018     Vitamin D deficiency 01/04/2018     Elevated sed rate 01/04/2018     Thyroid nodule 09/15/2017     Surgical History  She  has a past surgical history that includes Tubal ligation.   Past Surgical History:   Procedure Laterality Date     TUBAL LIGATION      Social History  Reviewed, and she  reports that she  has never smoked. She has never used smokeless tobacco. She reports that she does not drink alcohol or use illicit drugs.  Social History   Substance Use Topics     Smoking status: Never Smoker     Smokeless tobacco: Never Used     Alcohol use No      Allergies  No Known Allergies Family History  Reviewed, and family history includes No Medical Problems in her father and mother.   Psychosocial Needs  Social History     Social History Narrative     Additional psychosocial needs reviewed per nursing assessment.       Prior to Admission Medications     (Not in a hospital admission)        Review of Systems - Negative     /68  Pulse 72  Resp 16    Objective findings: General: The patient is alert and in no acute distress      Integument: Nails bilateral feet normal length and color. Skin bilateral feet warm supple and intact.      Vascular: DP and PT pulses +2/4 bilateral feet.  Capillary refill less than 2 seconds bilateral feet.      Neurological: Negative clonus, negative Babinski bilaterally.  There is normal protective sensation noted bilateral feet.      Musculoskeletal: Range of motion within normal limits bilateral feet.  Muscle power +5/5 bilaterally in all compartments.  There is a small raised firm subcutaneous mass on the medial aspect of the head of first metatarsal bilateral feet.     Assessment: Neuritis bilateral feet       Plan: I informed the patient that her symptoms are result to repetitive injuries to the medial dorsal cutaneous nerve both feet.  The patient stated that her active exercise program is causing this irritation to the nerve which is causing her symptoms.  I told the patient this is nothing to be overly concerned about.  She is to continue all of her activities.  She is to return to the clinic if her symptoms progress.

## 2021-06-18 NOTE — LETTER
Letter by Allison Carranza CNP at      Author: Allison Carranza CNP Service: -- Author Type: --    Filed:  Encounter Date: 3/6/2019 Status: (Other)        [facility]  [address]  [facility] [address]  [phone]         Meera Grier  [address] [address]        03/06[birth year]    Dear Meera Grier,     At [facility] we care about your health and well-being. Your primary care provider is committed to ensuring you receive high quality care and has chosen a network of specialists to assist in providing that care. Recently Allison Carranza CNP referred you to [facility] for an ultrasound. They have made several attempts to connect with you to assist with scheduling, however they have been unable to reach you by phone.       It is important to your overall health to follow through with the recommendation from your provider. Please call [facility] ([phone])  at your earliest convenience for assistance in scheduling an appointment.  If you have already scheduled this appointment, please disregard this notice.  Thank you for choosing [facility] for your healthcare needs.       Sincerely,     Allison Carranza CNP /   [facility] Specialty Scheduling

## 2021-06-18 NOTE — LETTER
Letter by Allison Carranza CNP at      Author: Allison Carranza CNP Service: -- Author Type: --    Filed:  Encounter Date: 2/14/2019 Status: (Other)       Meera Grier  [address] [facility] [address]             February 14, 2019         Dear Ms. Grier,    Below are the results from your recent visit:    Resulted Orders   Urinalysis-UC if Indicated   Result Value Ref Range    Color, UA Yellow Colorless, Yellow, Straw, Light Yellow    Clarity, UA Clear Clear    Glucose, UA Negative Negative    Bilirubin, UA Negative Negative    Ketones, UA Negative Negative    Specific Gravity, UA 1.025 1.005 - 1.030    Blood, UA Trace (!) Negative    pH, UA 5.5 5.0 - 8.0    Protein, UA Negative Negative mg/dL    Urobilinogen, UA 0.2 E.U./dL 0.2 E.U./dL, 1.0 E.U./dL    Nitrite, UA Negative Negative    Leukocytes, UA Negative Negative    Bacteria, UA Few (!) None Seen hpf    RBC, UA 0-2 None Seen, 0-2 hpf    WBC, UA None Seen None Seen, 0-5 hpf    Squam Epithel, UA 5-10 (!) None Seen, 0-5 lpf    Narrative    UC not indicated   HM2(CBC w/o Differential)   Result Value Ref Range    WBC 4.2 4.0 - 11.0 thou/uL    RBC 3.94 3.80 - 5.40 mill/uL    Hemoglobin 12.3 12.0 - 16.0 g/dL    Hematocrit 37.1 35.0 - 47.0 %    MCV 94 80 - 100 fL    MCH 31.2 27.0 - 34.0 pg    MCHC 33.1 32.0 - 36.0 g/dL    RDW 11.6 11.0 - 14.5 %    Platelets 349 140 - 440 thou/uL    MPV 6.4 (L) 7.0 - 10.0 fL   Comprehensive Metabolic Panel   Result Value Ref Range    Sodium 140 136 - 145 mmol/L    Potassium 4.2 3.5 - 5.0 mmol/L    Chloride 107 98 - 107 mmol/L    CO2 27 22 - 31 mmol/L    Anion Gap, Calculation 6 5 - 18 mmol/L    Glucose 62 (L) 70 - 125 mg/dL    BUN 12 8 - 22 mg/dL    Creatinine 0.82 0.60 - 1.10 mg/dL    GFR MDRD Af Amer >60 >60 mL/min/1.73m2    GFR MDRD Non Af Amer >60 >60 mL/min/1.73m2    Bilirubin, Total 0.3 0.0 - 1.0 mg/dL    Calcium 9.4 8.5 - 10.5 mg/dL    Protein, Total 7.1 6.0 - 8.0 g/dL    Albumin 3.6 3.5 - 5.0 g/dL    Alkaline Phosphatase  80 45 - 120 U/L    AST 18 0 - 40 U/L    ALT 13 0 - 45 U/L    Narrative    Fasting Glucose reference range is 70-99 mg/dL per  American Diabetes Association (ADA) guidelines.   Glycosylated Hemoglobin A1c   Result Value Ref Range    Hemoglobin A1c 5.3 3.5 - 6.0 %   Vitamin D, Total (25-Hydroxy)   Result Value Ref Range    Vitamin D, Total (25-Hydroxy) 20.0 (L) 30.0 - 80.0 ng/mL    Narrative    Deficiency <10.0 ng/mL  Insufficiency 10.0-29.9 ng/mL  Sufficiency 30.0-80.0 ng/mL  Toxicity (possible) >100.0 ng/mL   Vitamin B12   Result Value Ref Range    Vitamin B-12 536 213 - 816 pg/mL   Sedimentation Rate   Result Value Ref Range    Sed Rate 23 (H) 0 - 20 mm/hr   Thyroid Cascade   Result Value Ref Range    TSH 0.99 0.30 - 5.00 uIU/mL   Chlamydia trachomatis & Neisseria gonorrhoeae, Amplified Detection   Result Value Ref Range    Chlamydia trachomatis, Amplified Detection Negative Negative    Neisseria gonorrhoeae, Amplified Detection Negative Negative   Antinuclear Antibody (TYRESE) Cascade   Result Value Ref Range    TYRESE Screen Cascade 0.7 <=2.9 U    Narrative    <1.0 negative  1.1-2.9 weakly positive  3.0-5.9 positive ( reflex)  > or=6.0 strongly positive   Rheumatoid Factor Quant   Result Value Ref Range    Rheumatoid Factor Quantitative 16.6 0 - 30 IU/mL   Culture, Urine   Result Value Ref Range    Culture No Growth        Your urine initially showed blood, but the urine micro confirmed that no blood is present (RBC's).  Your urine culture is also normal.      Your sed rate remains mildly elevated, but has improved.  I recommend rechecking this in 2-3 months.  The rest of your autoimmune labs are normal.     Your vitamin D is low.  This can cause fatigue. I sent a prescription to the pharmacy for Vitamin D 95977 units once weekly for 12 weeks.  I recommend a lab recheck then.  Once you complete the high dose, I recommend that you take 2000 units of Vitamin D3 daily.      Please call with questions or contact us using  MyChart.    Sincerely,        Electronically signed by Allison Carranza CNP

## 2021-06-19 NOTE — LETTER
Letter by Allison Carranza CNP at      Author: Allison Carranza CNP Service: -- Author Type: --    Filed:  Encounter Date: 7/1/2019 Status: (Other)         Meera Grier  [address] [facility] [address]             July 1, 2019         Dear MsCathy Marvel,    Below are the results from your recent visit:    Resulted Orders   Vitamin D, Total (25-Hydroxy)   Result Value Ref Range    Vitamin D, Total (25-Hydroxy) 31.5 30.0 - 80.0 ng/mL    Narrative    Deficiency <10.0 ng/mL  Insufficiency 10.0-29.9 ng/mL  Sufficiency 30.0-80.0 ng/mL  Toxicity (possible) >100.0 ng/mL       Your Vitamin D is normal.  I recommend that you take 2000 units of Vitamin d3 daily which you can buy over-the-counter.     Please call with questions or contact us using [employer].    Sincerely,        Electronically signed by Allison Carranza CNP

## 2021-06-20 NOTE — LETTER
Letter by Allison Carranza CNP at      Author: Allison Carranza CNP Service: -- Author Type: --    Filed:  Encounter Date: 2/23/2020 Status: (Other)         Meera Grier  [address] [facility] [address]             February 23, 2020         Dear Ms. Grier,    Below are the results from your recent visit:    Resulted Orders   Urinalysis-UC if Indicated   Result Value Ref Range    Color, UA Yellow Colorless, Yellow, Straw, Light Yellow    Clarity, UA Clear Clear    Glucose, UA Negative Negative    Bilirubin, UA Negative Negative    Ketones, UA Negative Negative    Specific Gravity, UA 1.025 1.005 - 1.030    Blood, UA Negative Negative    pH, UA 5.5 5.0 - 8.0    Protein, UA Negative Negative mg/dL    Urobilinogen, UA 0.2 E.U./dL 0.2 E.U./dL, 1.0 E.U./dL    Nitrite, UA Negative Negative    Leukocytes, UA Negative Negative    Narrative    Microscopic not indicated  UC not indicated   Thyroid Cascade   Result Value Ref Range    TSH 1.42 0.30 - 5.00 uIU/mL   Wet Prep, Vaginal   Result Value Ref Range    Yeast Result No yeast seen No yeast seen    Trichomonas No Trichomonas seen No Trichomonas seen    Clue Cells, Wet Prep No Clue cells seen No Clue cells seen   Vitamin D, Total (25-Hydroxy)   Result Value Ref Range    Vitamin D, Total (25-Hydroxy) 38.4 30.0 - 80.0 ng/mL    Narrative    Deficiency <10.0 ng/mL  Insufficiency 10.0-29.9 ng/mL  Sufficiency 30.0-80.0 ng/mL  Toxicity (possible) >100.0 ng/mL       Your lab results are normal.      Please call with questions or contact us using [employer].    Sincerely,        Electronically signed by Allison Carranza CNP

## 2021-06-21 NOTE — PROGRESS NOTES
Assessment and Plan:     1. Bacterial vaginosis  Will treat with Metrogel.  Educated on its indications and side effects.   - metroNIDAZOLE (METROGEL VAGINAL) 0.75 % vaginal gel; Insert one applicatorful intravaginally for five nights.  Dispense: 70 g; Refill: 0    2. Vaginal discharge  - Wet Prep, Vaginal    3. Screening for STD (sexually transmitted disease)   Discussed safe sex practices.  Will notify patient of results.    - Chlamydia trachomatis & Neisseria gonorrhoeae, Amplified Detection    4. Thyroid nodule  Will check thyroid cascade and notify the patient of the results.    - Thyroid Cascade    5. Prediabetes  Will check A1C.  I congratulated her on her healthy lifestyle changes.   - Glycosylated Hemoglobin A1c    6. Visit for screening mammogram  - Mammo Screening Bilateral; Future    7.  Pap smear screening  - Gynecologic Cytology (PAP Smear)        Subjective:     Meera is a 52 y.o. female presenting to the clinic for multiple concerns today.  Patient complains of vaginal itching for 1 week.  She has noticed slight white vaginal discharge.  She is single and is sexually active.  She would like STD screening.  She has not tried any over-the-counter products for her symptoms.  Patient has noticed over the past year, her periods have been irregular.  She missed a period for 6 months with her last menstrual period occurring 2 months ago.  She did have some mild menstrual cramping and her period was light lasting for 7 days.  She has not had a period since.  Patient also requests thyroid labs today. She has a history of a thyroid ultrasound on 9/13/17 showing the following:     US THYROID  9/13/2017 8:41 AM     INDICATION: multinodular goiter; assess for changes in size of nodules  TECHNIQUE: Routine.  COMPARISON: Outside ultrasound 6/2/2016.     FINDINGS:  Right thyroid lobe measures 7 x 2.5 x 2.4 cm. Stable 2.1 x 1.9 x 1.5 cm upper pole nodule previously measured 2.2 x 2.1 x 1.7 cm. Stable lower pole  "nodule measures 3.7 x 2.6 x 2.5 cm.      Left thyroid lobe measures 6.3 x 2.1 x 1.9 cm. Slightly larger upper pole 2.1 x 2 x 1.5 cm nodule previously measured 2 x 1.7 x 1.5 cm. No significant change in the mid pole 1.7 x 1.6 x 1.3 cm nodule which previously measured 1.6 x 1.5 x 1.3 cm.      Isthmus measures 3 mm.         No cervical lymphadenopathy.     IMPRESSION:   CONCLUSION:  1.  The right thyroid 2.1 and 3.7 cm nodules are stable suggesting a benign etiology.  2.  Slightly larger left upper pole nodule. Statistically, this is also likely benign, however because of enlargement consider FNA    This resulted in a thyroid biopsy consistent with benign thyroid nodule. She has prediabetes and would like her A1C checked. She is consuming a healthy diet and has lost 5 lbs recently.     Review of Systems: A complete 14 point review of systems was obtained and is negative or as stated in the history of present illness.    Social History     Social History     Marital status: Single     Spouse name: N/A     Number of children: N/A     Years of education: N/A     Occupational History     Not on file.     Social History Main Topics     Smoking status: Never Smoker     Smokeless tobacco: Never Used     Alcohol use No     Drug use: No     Sexual activity: Yes     Partners: Male      Comment: in relationship     Other Topics Concern     Not on file     Social History Narrative       Active Ambulatory Problems     Diagnosis Date Noted     Thyroid nodule 09/15/2017     Vitamin D deficiency 01/04/2018     Elevated sed rate 01/04/2018     External hemorrhoid 02/01/2018     Prediabetes 04/26/2018     Resolved Ambulatory Problems     Diagnosis Date Noted     No Resolved Ambulatory Problems     No Additional Past Medical History       Family History   Problem Relation Age of Onset     No Medical Problems Mother      No Medical Problems Father        Objective:     /66  Pulse 72  Ht 5' 3\" (1.6 m)  Wt 172 lb 11.2 oz (78.3 kg) "  BMI 30.59 kg/m2    Patient is alert, in no obvious distress.   Skin: Warm, dry.  Neck: Supple, thyroid is mildly enlarged.   Lungs:  Clear to auscultation. Respirations even and unlabored.  No wheezing or rales noted.   Heart:  Regular rate and rhythm.  No murmurs, S3, S4, gallops, or rubs.    Abdomen: Soft, nontender.  No organomegaly. Bowel sounds normoactive. No guarding or masses noted.   :  External genitalia normal.  Normal vaginal mucosa.  Slight white vaginal discharge is present. Cervix no lesions or cervical motion tenderness.     LABORATORY: wet prep ordered showing clue cells.

## 2021-06-21 NOTE — LETTER
Letter by Natasha Navarro MD at      Author: Natasha Navarro MD Service: -- Author Type: --    Filed:  Encounter Date: 3/8/2021 Status: (Other)         Meera Grier  [address] [facility] [address]             March 8, 2021         Dear Ms. Grier,    Below are the results from your recent visit:    Resulted Orders   Urinalysis-UC if Indicated   Result Value Ref Range    Color, UA Yellow Colorless, Yellow, Straw, Light Yellow    Clarity, UA Clear Clear    Glucose, UA Negative Negative    Bilirubin, UA Small (!) Negative    Ketones, UA Negative Negative    Specific Gravity, UA >=1.030 1.005 - 1.030    Blood, UA Negative Negative    pH, UA 5.5 5.0 - 8.0    Protein, UA Trace (!) Negative mg/dL    Urobilinogen, UA 0.2 E.U./dL 0.2 E.U./dL, 1.0 E.U./dL    Nitrite, UA Negative Negative    Leukocytes, UA Negative Negative    Bacteria, UA Moderate (!) None Seen hpf    RBC, UA None Seen None Seen, 0-2 hpf    WBC, UA 0-5 None Seen, 0-5 hpf    Squam Epithel, UA 25-50 (!) None Seen, 0-5 lpf    Mucus, UA Moderate (!) None Seen lpf    Narrative    Urine Culture ordered based on [facility]' criteria   Culture, Urine   Result Value Ref Range    Culture No Growth        There is no blood in your urine. There is no infection in your urine.     Please call with questions or contact us using [employer].    Sincerely,        Electronically signed by Natasha Navarro MD

## 2021-06-22 NOTE — PROGRESS NOTES
Assessment:     1. Acute bronchitis due to Rhinovirus  ipratropium-albuterol 0.5-2.5 mg/3 mL nebulizer solution 3 mL (DUO-NEB)    benzonatate (TESSALON) 200 MG capsule    predniSONE (DELTASONE) 20 MG tablet    albuterol (PROAIR HFA;PROVENTIL HFA;VENTOLIN HFA) 90 mcg/actuation inhaler          Plan:     Differential diagnosis include but not limited to acute bronchitis, cough, upper respiratory infection.  Discussed with the patient on exam finding I did not find any indication for bacterial infection.  Patient was coughing throughout the visit therefore will treat with DuoNeb x1 in the clinic today.  Patient will be prescribed Tessalon Perles 3 times daily as needed for cough, prednisone 40 mg daily to start tomorrow morning and albuterol inhaler every 4-6 hours as needed for cough or shortness of breath.  Advised patient to increase fluid intake.  May also use a teaspoon of honey.  Drink tea with lemon, ginger, and honey.  Monitor for worsening symptoms.  If her symptoms does not improve she need to follow-up with a PCP.  Patient verbalized understanding the plan of care.    Subjective:       53 y.o. female presents for evaluation of a cough.  Patient reports that she has been having the cough for about 5 days.  She has been taking over-the-counter medication but is not working.  She admits that her cough is dry, nonproductive, sometimes she coughs so hard that she urinates on herself.  She denies a fever, no nausea, vomiting, diarrhea.  She denies history of asthma.  She denies shortness of breath.  She has not had any exposure and no one has similar symptoms like her.    The following portions of the patient's history were reviewed and updated as appropriate: allergies, current medications, past family history, past medical history, past social history, past surgical history and problem list.    Review of Systems  A 12 point comprehensive review of systems was negative except as noted.      Objective:      BP  122/74 (Patient Site: Right Arm, Patient Position: Sitting, Cuff Size: Adult Regular)   Pulse 91   Temp 98.4  F (36.9  C) (Oral)   Resp 16   Wt 174 lb (78.9 kg)   LMP  (LMP Unknown)   SpO2 99%   BMI 30.82 kg/m    General appearance: alert, appears stated age, cooperative and moderate distress  Head: Normocephalic, without obvious abnormality, atraumatic, sinuses nontender to percussion  Eyes: conjunctivae/corneas clear. PERRL, EOM's intact. Fundi benign.  Ears: abnormal TM right ear - bulging and air-fluid level and abnormal TM left ear - bulging and air-fluid level  Nose: Nares normal. Septum midline. Mucosa normal. No drainage or sinus tenderness., clear discharge, moderate congestion  Throat: lips, mucosa, and tongue normal; teeth and gums normal and Postnasal drainage  Lungs: clear to auscultation bilaterally  Heart: regular rate and rhythm, S1, S2 normal, no murmur, click, rub or gallop  Extremities: extremities normal, atraumatic, no cyanosis or edema  Pulses: 2+ and symmetric  Skin: Skin color, texture, turgor normal. No rashes or lesions  Lymph nodes: Cervical, supraclavicular, and axillary nodes normal.  Neurologic: Grossly normal     This note has been dictated using voice recognition software. Any grammatical or context distortions are unintentional and inherent to the software

## 2021-06-23 NOTE — PROGRESS NOTES
Assessment and Plan:     1. Pelvic pain in female  Differentials include urinary tract infection, STDs, ovarian cyst, constipation, nephrolithiasis.  Urinalysis shows trace blood.  Will check urine micro and culture for further evaluation.  Will obtain pelvic ultrasound to rule out ovarian cyst and endometrial polyp.  - Urinalysis-UC if Indicated  - US Pelvis With Transvaginal Non OB; Future  - Culture, Urine    2. Screening examination for STD (sexually transmitted disease)  Discussed safe sex practices.  Will notify patient of results.  - Chlamydia trachomatis & Neisseria gonorrhoeae, Amplified Detection    3. Thyroid nodule  Patient had a thyroid biopsy which was benign.  Will check thyroid cascade.  - Thyroid Cascade    4. Prediabetes  Patient has a history of prediabetes.  We will check A1c.  - Glycosylated Hemoglobin A1c    5. Elevated sed rate  She has a history of an elevated sed rate.  We will rule out autoimmune disease.  - Sedimentation Rate  - Antinuclear Antibody (TYRESE) Cascade  - Rheumatoid Factor Quant    6. Vitamin D deficiency  Patient is taking 1000 units of vitamin D3 daily.  Will check vitamin D and adjust dose accordingly.  - Vitamin D, Total (25-Hydroxy)    7. Fatigue, unspecified type  We will rule out anemia, vitamin deficiencies, autoimmune disease.  Discussed good sleep hygiene.  Further plans pending the results.  She is content with the plan.  - HM2(CBC w/o Differential)  - Comprehensive Metabolic Panel  - Vitamin B12        Subjective:     Meera is a 53 y.o. female presenting to the clinic for multiple concerns today.  Patient has had an intermittent throbbing sensation within her right pelvic region for 2 weeks.  She has a history of an endometrial polyp which was removed by Em[facility].  She feels as though she is experiencing similar symptoms to when she had the polyp. She feels as though nothing exacerbates the pain.  The pain will last for 10 minutes.  She has been taking ibuprofen  with relief.  She denies nausea, vomiting, constipation, diarrhea.  Her last menstrual period was in October.  She is single and has no concerns for STDs.  She denies dysuria, hematuria, urinary urgency, urinary frequency, low back pain, fever.  She has also been experiencing fatigue for 2 months.  She exercises 3 times per week and consumes a healthy diet.  She denies any weight fluctuations.  She has not had any joint pain or body aches.  She is sleeping well at night.  She denies any recent tick bites.  She does have a history of an elevated sed rate and vitamin D deficiency.  She is taking 1000 units of vitamin D3 daily.    Review of Systems: A complete 14 point review of systems was obtained and is negative or as stated in the history of present illness.    Social History     Socioeconomic History     Marital status: Single     Spouse name: Not on file     Number of children: Not on file     Years of education: Not on file     Highest education level: Not on file   Social Needs     Financial resource strain: Not on file     Food insecurity - worry: Not on file     Food insecurity - inability: Not on file     Transportation needs - medical: Not on file     Transportation needs - non-medical: Not on file   Occupational History     Not on file   Tobacco Use     Smoking status: Never Smoker     Smokeless tobacco: Never Used   Substance and Sexual Activity     Alcohol use: No     Drug use: No     Sexual activity: Yes     Partners: Male     Comment: in relationship   Other Topics Concern     Not on file   Social History Narrative     Not on file       Active Ambulatory Problems     Diagnosis Date Noted     Thyroid nodule 09/15/2017     Vitamin D deficiency 01/04/2018     Elevated sed rate 01/04/2018     External hemorrhoid 02/01/2018     Prediabetes 04/26/2018     Resolved Ambulatory Problems     Diagnosis Date Noted     No Resolved Ambulatory Problems     No Additional Past Medical History       No family history on  "file.    Objective:     /60   Pulse 77   Ht 5' 3\" (1.6 m)   Wt 178 lb 9.6 oz (81 kg)   SpO2 98%   BMI 31.64 kg/m      Patient is alert, in no obvious distress.   Skin: Warm, dry.   Lungs:  Clear to auscultation. Respirations even and unlabored.  No wheezing or rales noted.   Heart:  Regular rate and rhythm.  No murmurs, S3, S4, gallops, or rubs.    Abdomen: Soft, nontender.  No organomegaly. Bowel sounds normoactive. No guarding or masses noted.               "

## 2021-06-25 NOTE — TELEPHONE ENCOUNTER
----- Message from Olesya Wilson CMA sent at 3/18/2019  8:22 AM CDT -----      ----- Message -----  From: Allison Carranza CNP  Sent: 3/17/2019   1:49 PM  To: Allison Carranza Care Team Pool    Please notify the patient that her pelvic ultrasound is normal.  I can certainly refer her to a gynecologist if her symptoms persist.  Thanks.

## 2021-06-25 NOTE — PROGRESS NOTES
Assessment and Plan:     1. Insomnia, unspecified type  hydrOXYzine pamoate (VISTARIL) 50 MG capsule     Discussed good sleep hygiene.  Will treat with hydroxyzine as needed.  Educated on its indications and side effects.  She is to avoid taking this with other sedatives.  If no improvement in symptoms, she is to follow-up.  She is content with the plan.    Subjective:     Meera is a 55 y.o. female presenting to the clinic for concerns for insomnia.  Patient feels as though she has a regular sleep pattern, but goes to bed at approximately 11:30 PM.  She wakes up at 4:15 AM for work.  Patient has had ruminating thoughts at bedtime.  She is also waking up in the middle the night.  She believes she may snore.  She tries to take a bath prior to bedtime to help her relax.  She has cut caffeine out of her diet.  She has been regularly exercising in the morning.  She has not tried taking any over-the-counter products for her symptoms.  She has used hydroxyzine in the past with relief.    Review of Systems: A complete 14 point review of systems was obtained and is negative or as stated in the history of present illness.    Social History     Socioeconomic History     Marital status: Single     Spouse name: Not on file     Number of children: Not on file     Years of education: Not on file     Highest education level: Not on file   Occupational History     Not on file   Social Needs     Financial resource strain: Not on file     Food insecurity     Worry: Not on file     Inability: Not on file     Transportation needs     Medical: Not on file     Non-medical: Not on file   Tobacco Use     Smoking status: Never Smoker     Smokeless tobacco: Never Used   Substance and Sexual Activity     Alcohol use: No     Drug use: No     Sexual activity: Yes     Partners: Male     Comment: in relationship   Lifestyle     Physical activity     Days per week: Not on file     Minutes per session: Not on file     Stress: Not on file    Relationships     Social connections     Talks on phone: Not on file     Gets together: Not on file     Attends Anglican service: Not on file     Active member of club or organization: Not on file     Attends meetings of clubs or organizations: Not on file     Relationship status: Not on file     Intimate partner violence     Fear of current or ex partner: Not on file     Emotionally abused: Not on file     Physically abused: Not on file     Forced sexual activity: Not on file   Other Topics Concern     Not on file   Social History Narrative     Not on file       Active Ambulatory Problems     Diagnosis Date Noted     Thyroid nodule 09/15/2017     Vitamin D deficiency 01/04/2018     Elevated sed rate 01/04/2018     External hemorrhoid 02/01/2018     Prediabetes 04/26/2018     Resolved Ambulatory Problems     Diagnosis Date Noted     No Resolved Ambulatory Problems     No Additional Past Medical History       No family history on file.    Objective:     /74   Pulse 94   Wt 180 lb 4.8 oz (81.8 kg)   SpO2 97%   BMI 31.94 kg/m      Patient is alert, in no obvious distress.   Skin: Warm, dry.   Lungs:  Clear to auscultation. Respirations even and unlabored.  No wheezing or rales noted.   Heart:  Regular rate and rhythm.  No murmurs, S3, S4, gallops, or rubs.

## 2021-06-25 NOTE — TELEPHONE ENCOUNTER
Patient Returning Call  Reason for call:  Patient is returning a call.  Information relayed to patient:  Below message from Allison Carranza   Patient has additional questions:  Yes  If YES, what are your questions/concerns:  Patient is continuing to have pains, today there were the worst. Patient is asking for a referral to OB/GYN and to assist her in scheduling, please advise.  Okay to leave a detailed message?: Yes [phone]

## 2021-07-06 VITALS
WEIGHT: 180.3 LBS | OXYGEN SATURATION: 97 % | SYSTOLIC BLOOD PRESSURE: 126 MMHG | BODY MASS INDEX: 31.94 KG/M2 | DIASTOLIC BLOOD PRESSURE: 74 MMHG | HEART RATE: 94 BPM

## 2021-07-09 ENCOUNTER — HOSPITAL ENCOUNTER (EMERGENCY)
Dept: EMERGENCY MEDICINE | Facility: CLINIC | Age: 56
Discharge: HOME | End: 2021-07-09
Attending: EMERGENCY MEDICINE
Payer: COMMERCIAL

## 2021-07-09 ENCOUNTER — COMMUNICATION - HEALTHEAST (OUTPATIENT)
Dept: SCHEDULING | Facility: CLINIC | Age: 56
End: 2021-07-09

## 2021-07-09 DIAGNOSIS — M79.605 PAIN OF LEFT LOWER EXTREMITY: ICD-10-CM

## 2021-07-09 LAB
ANION GAP SERPL CALCULATED.3IONS-SCNC: 7 MMOL/L (ref 5–18)
BUN SERPL-MCNC: 9 MG/DL (ref 8–22)
CALCIUM SERPL-MCNC: 8.7 MG/DL (ref 8.5–10.5)
CHLORIDE BLD-SCNC: 109 MMOL/L (ref 98–107)
CO2 SERPL-SCNC: 27 MMOL/L (ref 22–31)
CREAT SERPL-MCNC: 0.84 MG/DL (ref 0.6–1.1)
ERYTHROCYTE [DISTWIDTH] IN BLOOD BY AUTOMATED COUNT: 12 % (ref 11–14.5)
GFR SERPL CREATININE-BSD FRML MDRD: >60 ML/MIN/1.73M2
GLUCOSE BLD-MCNC: 93 MG/DL (ref 70–125)
HCT VFR BLD AUTO: 33.2 % (ref 35–47)
HGB BLD-MCNC: 10.8 G/DL (ref 12–16)
MAGNESIUM SERPL-MCNC: 2 MG/DL (ref 1.8–2.6)
MCH RBC QN AUTO: 30.3 PG (ref 27–34)
MCHC RBC AUTO-ENTMCNC: 32.5 G/DL (ref 32–36)
MCV RBC AUTO: 93 FL (ref 80–100)
PLATELET # BLD AUTO: 318 THOU/UL (ref 140–440)
PMV BLD AUTO: 8.4 FL (ref 8.5–12.5)
POTASSIUM BLD-SCNC: 3.8 MMOL/L (ref 3.5–5)
RBC # BLD AUTO: 3.56 MILL/UL (ref 3.8–5.4)
SODIUM SERPL-SCNC: 143 MMOL/L (ref 136–145)
WBC: 6.7 THOU/UL (ref 4–11)

## 2021-07-09 ASSESSMENT — MIFFLIN-ST. JEOR: SCORE: 1404.21

## 2021-07-09 NOTE — ED TRIAGE NOTES
ED Triage Notes by Leida Hathaway RN at 7/9/2021  1:12 AM     Author: Leida Hathaway RN Service: -- Author Type: Registered Nurse    Filed: 7/9/2021  1:13 AM Date of Service: 7/9/2021  1:12 AM Status: Signed    : Leida Hathaway RN (Registered Nurse)       PT presents to ED with c/o left leg pain, mostly in the back of the knee.  States the whole leg feels warm.  Toes feel tingly.  Denies injury.  Pt sits at work about 3 hours at a time.

## 2021-07-09 NOTE — ED PROVIDER NOTES
ED Provider Notes by Sera Booth MD at 7/9/2021  1:44 AM     Author: Sera Booth MD Service: -- Author Type: Physician    Filed: 7/9/2021  3:31 AM Date of Service: 7/9/2021  1:44 AM Status: Signed    : Sera Booth MD (Physician)       EMERGENCY DEPARTMENT ENCOUNTER      NAME: Meera Grier  AGE: 55 y.o. female  YOB: 1965  MRN: [id number]  EVALUATION DATE & TIME: 7/9/2021  1:12 AM    PCP: Allison Carranza CNP    ED PROVIDER: Sera Booth M.D.      Chief Complaint   Patient presents with   ? Leg Pain         FINAL IMPRESSION:  1. Pain of left lower extremity          ED COURSE & MEDICAL DECISION MAKING:    Pertinent Labs & Imaging studies reviewed. (See chart for details)  55 y.o. female presents to the Emergency Department for evaluation of left leg pain onset few hours ago    1:46 AM I met with the patient, obtained history, performed an initial exam, and discussed options and plan for diagnostics and treatment here in the ED. PPE: Provider wore surgical mask.   3:14 AM Rechecked and updated the patient. We discussed plans for discharge including supportive cares, symptomatic treatment, outpatient follow up, and reasons to return to the emergency department.     ED Course as of Jul 09 0330 Fri Jul 09, 2021   0304 Patient has an achy pain throughout her left leg.  She has good blood flow so it is not an arterial thrombus or occlusion.  The leg is normal temperature as well.  The skin is normal there is no evidence for rash or cellulitis.  The knee shows no evidence of instability and the hip is not locally tender.  Consider possible electrolyte imbalance causing muscular pain so did do blood work for that but that is unremarkable except for some mild dehydration.  She is mildly anemic without bruising on the leg this is likely incidental finding.  Her white blood cell count is normal.  Hemogram was done in case there was abnormality where we would need to  start her on blood thinners.  Venous ultrasound shows no evidence for DVT or popliteal cyst.  The discomfort does not follow a dermatomal distribution nor is there any involvement of the vaginal/perineal area and her complaint so does not seem to be related to herniated disc.  Unclear cause but will treat as muscular pain for now.  We discussed doing Tylenol here and she did not want anything stronger but I think we could consider muscle relaxer at home if needed.  Patient is reassured had wanted to make sure that there was no evidence for DVT.  We discussed reasons to return including developing redness of an infection that just is not seen but with a normal white blood cell count I think that is less likely now as well.    [NB]   0321 Discussed with patient her results.  Did discuss the possibility that this is a pinched nerve and typically treatment at home is the anti-inflammatories, rest.  She wants to avoid muscle relaxers at this time.  As there is no proven benefit to steroids I would hold off on that at this time as her distribution of pain is not entirely clear that its pinched nerve yet, especially as she has no pain in her lower back or her buttocks/si joint.  But she will follow-up with the primary care physician next week and return over the weekend as needed for worsened symptoms.    [NB]      ED Course User Index  [NB] Sera Booth MD        At the conclusion of the encounter I discussed the results of all of the tests and the disposition. The questions were answered. The patient or family acknowledged understanding and was agreeable with the care plan.       0 minutes of critical care time not including time for procedures which is documented separately under procedures heading.    MEDICATIONS GIVEN IN THE EMERGENCY:  Medications   acetaminophen tablet 1,000 mg (TYLENOL) (1,000 mg Oral Given 7/9/21 0205)       NEW PRESCRIPTIONS STARTED AT TODAY'S ER VISIT  Current Discharge Medication List       CONTINUE these medications which have NOT CHANGED    Details   albuterol (PROAIR HFA;PROVENTIL HFA;VENTOLIN HFA) 90 mcg/actuation inhaler Inhale 2 puffs every 6 (six) hours as needed.  Qty: 1 each, Refills: 0    Associated Diagnoses: Acute bronchitis due to Rhinovirus      cholecalciferol, vitamin D3, 1,000 unit (25 mcg) tablet Take 1,000 Units by mouth daily.      hydrOXYzine pamoate (VISTARIL) 50 MG capsule Take 1-2 capsules ( mg total) by mouth at bedtime as needed.  Qty: 60 capsule, Refills: 2    Associated Diagnoses: Insomnia, unspecified type      olopatadine (PATANOL) 0.1 % ophthalmic solution Administer 1 drop to both eyes 2 (two) times a day.  Qty: 5 mL, Refills: 1    Associated Diagnoses: Allergic conjunctivitis, bilateral                =================================================================    HPI    Patient information was obtained from: Patient    Use of : N/A       Meera Grier is a 55 y.o. female with a pertinent history of prediabetes who presents to this ED by private vehicle for evaluation of left leg pain.     Patient reports that she that 3-4 hours ago she suddenly developed pain in her left leg, mainly localized behind her left knee. She adds that her whole leg feels warm and her toes feel tingly. No recent falls, twisting of the knee, or other injury. No history of similar pain. No personal history of blood clots, but patient notes that her mother used to have blood clots in her legs. No recent travel. Patient states that she is not on birth control. 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 She is otherwise healthy and only takes daily vitamin D and iron pills. She denies back pain or other complaints at this time.     Social history: Nonsmoker.       REVIEW OF SYSTEMS   Review of Systems   Musculoskeletal: Positive for myalgias (left leg pain). Negative for back pain.   Skin:        +Left leg feels warm    Neurological:        +Tingling sensation in toes of left foot   Otherwise, outside of that noted in the HPI, all other systems negative.    PAST MEDICAL HISTORY:  History reviewed. No pertinent past medical history.    PAST SURGICAL HISTORY:  Past Surgical History:   Procedure Laterality Date   ? TUBAL LIGATION     ? US THYROID BIOPSY  3/31/2020           CURRENT MEDICATIONS:    No current facility-administered medications on file prior to encounter.      Current Outpatient Medications on File Prior to Encounter   Medication Sig   ? albuterol (PROAIR HFA;PROVENTIL HFA;VENTOLIN HFA) 90 mcg/actuation inhaler Inhale 2 puffs every 6 (six) hours as needed.   ? cholecalciferol, vitamin D3, 1,000 unit (25 mcg) tablet Take 1,000 Units by mouth daily.   ? hydrOXYzine pamoate (VISTARIL) 50 MG capsule Take 1-2 capsules ( mg total) by mouth at bedtime as needed.   ? olopatadine (PATANOL) 0.1 % ophthalmic solution Administer 1 drop to both eyes 2 (two) times a day.       ALLERGIES:  No Known Allergies    FAMILY HISTORY:  History reviewed. No pertinent family history.    SOCIAL HISTORY:   Social History     Socioeconomic History   ? Marital status: Single     Spouse name: None   ? Number of children: None   ? Years of education: None   ? Highest education level: None   Occupational History   ? None   Social Needs   ? Financial resource strain: None   ? Food insecurity     Worry: None     Inability: None   ? Transportation needs     Medical: None     Non-medical: None   Tobacco Use   ? Smoking status: Never Smoker   ? Smokeless tobacco: Never Used   Substance and Sexual Activity   ? Alcohol use: No   ? Drug use: No   ? Sexual activity: Yes     Partners: Male     Comment: in relationship   Lifestyle   ? Physical activity     Days per week: None     Minutes per session: None   ? Stress: None   Relationships   ? Social connections     Talks on phone: None     Gets together: None     Attends Holiness service: None     Active  "member of club or organization: None     Attends meetings of clubs or organizations: None     Relationship status: None   ? Intimate partner violence     Fear of current or ex partner: None     Emotionally abused: None     Physically abused: None     Forced sexual activity: None   Other Topics Concern   ? None   Social History Narrative   ? None       VITALS:  Patient Vitals for the past 24 hrs:   BP Temp src Pulse Resp SpO2 Height Weight   07/09/21 0115 -- -- 72 -- -- -- --   07/09/21 0113 145/80 Oral (!) 105 18 98 % 5' 3\" (1.6 m) 183 lb (83 kg)       PHYSICAL EXAM    Constitutional:  Well nourished, NAD.  HENT:  Normocephalic, posterior pharynx wnl, wearing a mask  Eyes:  PERRL, EOMI, Conjunctiva normal, No discharge, no scleral icterus.  Respiratory:  Breathing easily, cta  Cardiovascular:  rrr, nl s1s2 0 murmurs, rubs, or gallops.  2+ dp and pt pulses bilaterally. No peripheral edema   GI:  Bowel sounds normal, Soft, No tenderness, No flank tenderness, nondistended.  :No CVA tenderness.   Musculoskeletal:  Moves all extremities.  No erythematous or swollen major joints. Calf size equivalent. Equal temperature to palpation on both legs. No pulsatile mass posterior to the left knee. Left knee examination has full ROM and normal anterior and posterior drawer tests, normal varus and valgus give.    Integument:  Warm, Dry, No erythema, No rash.   Lymphatic:  No cervical lymphadenopathy  Neurologic:  Alert & oriented x 3, Normal motor function, Normal sensory function, No focal deficits noted. Normal speech.  Psychiatric:  Affect normal, Judgment normal, Mood normal.        LAB:  All pertinent labs reviewed and interpreted.  Results for orders placed or performed during the hospital encounter of 07/09/21   Basic Metabolic Panel   Result Value Ref Range    Sodium 143 136 - 145 mmol/L    Potassium 3.8 3.5 - 5.0 mmol/L    Chloride 109 (H) 98 - 107 mmol/L    CO2 27 22 - 31 mmol/L    Anion Gap, Calculation 7 5 - 18 " mmol/L    Glucose 93 70 - 125 mg/dL    Calcium 8.7 8.5 - 10.5 mg/dL    BUN 9 8 - 22 mg/dL    Creatinine 0.84 0.60 - 1.10 mg/dL    GFR MDRD Af Amer >60 >60 mL/min/1.73m2    GFR MDRD Non Af Amer >60 >60 mL/min/1.73m2   HM2(CBC w/o Differential)   Result Value Ref Range    WBC 6.7 4.0 - 11.0 thou/uL    RBC 3.56 (L) 3.80 - 5.40 mill/uL    Hemoglobin 10.8 (L) 12.0 - 16.0 g/dL    Hematocrit 33.2 (L) 35.0 - 47.0 %    MCV 93 80 - 100 fL    MCH 30.3 27.0 - 34.0 pg    MCHC 32.5 32.0 - 36.0 g/dL    RDW 12.0 11.0 - 14.5 %    Platelets 318 140 - 440 thou/uL    MPV 8.4 (L) 8.5 - 12.5 fL   Magnesium   Result Value Ref Range    Magnesium 2.0 1.8 - 2.6 mg/dL       RADIOLOGY:  Reviewed all pertinent imaging. Please see official radiology report.  Us Venous Leg Left    Result Date: 7/9/2021  EXAM: US VENOUS LEG LEFT LOCATION: [facility] DATE/TIME: 7/9/2021 2:30 AM INDICATION: swelling feeling, and pain. COMPARISON: None. TECHNIQUE: Venous Duplex ultrasound of the left lower extremity with and without compression, augmentation and duplex. Color flow and spectral Doppler with waveform analysis performed. FINDINGS: Exam includes the common femoral, femoral, popliteal, and contralateral common femoral veins as well as segmentally visualized deep calf veins and greater saphenous vein. LEFT: No deep vein thrombosis. No superficial thrombophlebitis. No popliteal cyst.     1.  No deep venous thrombosis in the left lower extremity.        I, Aspen Watson, am serving as a scribe to document services personally performed by Dr. Leobardo MD, based on my observation and the provider's statements to me. I, Sera Booth MD attest that Aspen Watson is acting in a scribe capacity, has observed my performance of the services and has documented them in accordance with my direction.    Sera Booth M.D.  Emergency Medicine  [facility]  [place] EMERGENCY ROOM  [facility] [facility] [address]  Dept: [phone]  Loc: [phone]     Sera Booth MD  07/09/21 0333

## 2021-07-09 NOTE — TELEPHONE ENCOUNTER
Telephone Encounter by Amarilis Herrera RN at 7/9/2021 12:27 AM     Author: Amarilis Herrera RN Service: -- Author Type: Registered Nurse    Filed: 7/9/2021 12:42 AM Encounter Date: 7/9/2021 Status: Signed    : Amarilis Herrera RN (Registered Nurse)       Working out earlier today.   After that she began to feel pain behind her left knee and warmth of her entire leg.  Has pain on the outside of her left calf that's developed in the last two hours.   The leg continues to be warm from hip to ankle.   She continually stated if feels like it's inflamed.   The pain behind her left knee has continued.  No swelling that she can tell.  I instructed she be seen in an ER.  She stated understanding and agreement.  She plans to go to [facility] ER.  She's going to try to find someone to drive her.    COVID 19 Nurse Triage Plan/Patient Instructions    Please be aware that novel coronavirus (COVID-19) may be circulating in the community. If you develop symptoms such as fever, cough, or SOB or if you have concerns about the presence of another infection including coronavirus (COVID-19), please contact your health care provider or visit  [email or web address].    Disposition/Instructions    ED Visit recommended. Follow protocol based instructions.      Bring Your Own Device:  Please also bring your smart device(s) (smart phones, tablets, laptops) and their charging cables for your personal use and to communicate with your care team during your visit.      Thank you for taking steps to prevent the spread of this virus.  o Limit your contact with others.  o Wear a simple mask to cover your cough.  o Wash your hands well and often.    Resources    [facility]: About COVID-19: [email or web address]    CDC: What to Do If You're Sick: www.cdc.gov/coronavirus/2019-ncov/about/steps-when-sick.html    CDC: Ending Home Isolation: www.cdc.gov/coronavirus/2019-ncov/hcp/disposition-in-home-patients.html     CDC: Caring  for Someone: www.cdc.gov/coronavirus/2019-ncov/if-you-are-sick/care-for-someone.html     [facility]: Interim Guidance for Hospital Discharge to Home: www.health.Count includes the [facility].[place]/diseases/coronavirus/hcp/hospdischarge.pdf    [facility] clinical trials (COVID-19 research studies): [email or web address]     Below are the COVID-19 hotlines at the [place] Department of [facility] ([facility]). Interpreters are available.   o For health questions: Call 620-343-4633 or 1-969.218.6766 (7 a.m. to 7 p.m.)  o For questions about schools and childcare: Call 713-362-3182 or 1-769.706.3898 (7 a.m. to 7 p.m.)           Reason for Disposition  ? [1] Thigh or calf pain AND [2] only 1 side AND [3] present > 1 hour    Additional Information  ? Negative: Sounds like a life-threatening emergency to the triager  ? Negative: [1] Swollen joint AND [2] fever  ? Negative: [1] Red area or streak AND [2] fever  ? Negative: Patient sounds very sick or weak to the triager  ? Negative: [1] SEVERE pain (e.g., excruciating, unable to walk) AND [2] not improved after 2 hours of pain medicine  ? Negative: [1] Can't move swollen joint at all AND [2] no fever    Protocols used: KNEE PAIN-A-AH

## 2021-07-10 VITALS — WEIGHT: 183 LBS | HEIGHT: 63 IN | BODY MASS INDEX: 32.43 KG/M2

## 2021-11-10 DIAGNOSIS — E55.9 VITAMIN D DEFICIENCY: Primary | ICD-10-CM

## 2021-11-10 NOTE — TELEPHONE ENCOUNTER
Patient calling requesting a refill of the prescription for Vitamin D.    Patient stated that she use to take this in the past and would like to take this again.     Please advise

## 2021-11-30 ENCOUNTER — OFFICE VISIT (OUTPATIENT)
Dept: FAMILY MEDICINE | Facility: CLINIC | Age: 56
End: 2021-11-30
Payer: COMMERCIAL

## 2021-11-30 VITALS
HEART RATE: 60 BPM | WEIGHT: 184.2 LBS | SYSTOLIC BLOOD PRESSURE: 100 MMHG | BODY MASS INDEX: 32.63 KG/M2 | DIASTOLIC BLOOD PRESSURE: 60 MMHG

## 2021-11-30 DIAGNOSIS — Z12.31 ENCOUNTER FOR SCREENING MAMMOGRAM FOR BREAST CANCER: ICD-10-CM

## 2021-11-30 DIAGNOSIS — E04.1 THYROID NODULE: ICD-10-CM

## 2021-11-30 DIAGNOSIS — M79.642 PAIN OF LEFT HAND: ICD-10-CM

## 2021-11-30 DIAGNOSIS — R20.2 PARESTHESIA: Primary | ICD-10-CM

## 2021-11-30 DIAGNOSIS — R73.03 PREDIABETES: ICD-10-CM

## 2021-11-30 LAB
ERYTHROCYTE [DISTWIDTH] IN BLOOD BY AUTOMATED COUNT: 12.1 % (ref 10–15)
ERYTHROCYTE [SEDIMENTATION RATE] IN BLOOD BY WESTERGREN METHOD: 28 MM/HR (ref 0–20)
FERRITIN SERPL-MCNC: 121 NG/ML (ref 10–130)
HBA1C MFR BLD: 5.3 % (ref 0–5.6)
HCT VFR BLD AUTO: 34.3 % (ref 35–47)
HGB BLD-MCNC: 11.1 G/DL (ref 11.7–15.7)
MCH RBC QN AUTO: 30.5 PG (ref 26.5–33)
MCHC RBC AUTO-ENTMCNC: 32.4 G/DL (ref 31.5–36.5)
MCV RBC AUTO: 94 FL (ref 78–100)
PLATELET # BLD AUTO: 310 10E3/UL (ref 150–450)
RBC # BLD AUTO: 3.64 10E6/UL (ref 3.8–5.2)
RHEUMATOID FACT SER NEPH-ACNC: 20 IU/ML (ref 0–30)
TSH SERPL DL<=0.005 MIU/L-ACNC: 1.22 UIU/ML (ref 0.3–5)
VIT B12 SERPL-MCNC: 474 PG/ML (ref 213–816)
WBC # BLD AUTO: 6.4 10E3/UL (ref 4–11)

## 2021-11-30 PROCEDURE — 84443 ASSAY THYROID STIM HORMONE: CPT | Performed by: NURSE PRACTITIONER

## 2021-11-30 PROCEDURE — 36415 COLL VENOUS BLD VENIPUNCTURE: CPT | Performed by: NURSE PRACTITIONER

## 2021-11-30 PROCEDURE — 86431 RHEUMATOID FACTOR QUANT: CPT | Performed by: NURSE PRACTITIONER

## 2021-11-30 PROCEDURE — 86039 ANTINUCLEAR ANTIBODIES (ANA): CPT | Performed by: NURSE PRACTITIONER

## 2021-11-30 PROCEDURE — 83036 HEMOGLOBIN GLYCOSYLATED A1C: CPT | Performed by: NURSE PRACTITIONER

## 2021-11-30 PROCEDURE — 86038 ANTINUCLEAR ANTIBODIES: CPT | Performed by: NURSE PRACTITIONER

## 2021-11-30 PROCEDURE — 82728 ASSAY OF FERRITIN: CPT | Performed by: NURSE PRACTITIONER

## 2021-11-30 PROCEDURE — 82607 VITAMIN B-12: CPT | Performed by: NURSE PRACTITIONER

## 2021-11-30 PROCEDURE — 85652 RBC SED RATE AUTOMATED: CPT | Performed by: NURSE PRACTITIONER

## 2021-11-30 PROCEDURE — 99214 OFFICE O/P EST MOD 30 MIN: CPT | Performed by: NURSE PRACTITIONER

## 2021-11-30 PROCEDURE — 85027 COMPLETE CBC AUTOMATED: CPT | Performed by: NURSE PRACTITIONER

## 2021-11-30 NOTE — PROGRESS NOTES
Assessment and Plan:     Paresthesia  We will rule out vitamin B12 deficiency, anemia, autoimmune disease.  Further plans pending the results.  If labs are normal, will consider referral to neurology.  - Vitamin B12  - CBC with platelets  - Ferritin  - Vitamin B12  - Anti Nuclear Marian IgG by IFA with Reflex  - Rheumatoid factor  - ESR: Erythrocyte sedimentation rate  - Vitamin B12  - CBC with platelets  - Ferritin  - Anti Nuclear Marian IgG by IFA with Reflex  - Rheumatoid factor  - ESR: Erythrocyte sedimentation rate  - Hemoglobin A1c    Prediabetes  We will evaluate for diabetes due to paresthesias.  - Hemoglobin A1c    Pain of left hand  Differentials include carpal tunnel syndrome, osteoarthritis, pain from autoimmune disease.  X-ray shows no acute fracture.  Will have radiology review.  Will refer to orthopedics.  - Orthopedic  Referral  - XR Hand Left G/E 3 Views    Thyroid nodule  We will check thyroid cascade.  - TSH with free T4 reflex  - TSH with free T4 reflex    Encounter for screening mammogram for breast cancer  - *MA Screening Digital Bilateral        Subjective:     Meera is a 56 year old female presenting to the clinic for multiple concerns today.  She has been experiencing left hand pain for 2 weeks.  The pain has been waking her up at night.  She has tried wearing a brace with no relief.  She has numbness and tingling within her second through fourth fingers.  Patient was told that she had mild carpal tunnel a few years ago.  She had has not been performing any activities with repetitive motion.  Pain is not bothersome during the day.  She has not noticed any redness or swelling.  Patient is also concerned of tingling within her toes.  She was evaluated in the emergency room on 7/9/2021 with concerns of left lower extremity pain.  Ultrasound showed no DVT.  Patient denies pain in the leg, but continues to experience numbness and tingling within all the toes of her left foot and her large  right toe.  She has been taking vitamin B12 intermittently and symptoms do improve when she does take this.  She also has a history of iron deficiency anemia.    Reviewof Systems: A complete 14 point review of systems was obtained and is negative or as stated in the history of present illness.    Social History     Socioeconomic History     Marital status: Single     Spouse name: Not on file     Number of children: Not on file     Years of education: Not on file     Highest education level: Not on file   Occupational History     Not on file   Tobacco Use     Smoking status: Never Smoker     Smokeless tobacco: Never Used   Substance and Sexual Activity     Alcohol use: No     Drug use: No     Sexual activity: Yes     Partners: Male     Comment: in relationship; in relationship   Other Topics Concern     Not on file   Social History Narrative     Not on file     Social Determinants of Health     Financial Resource Strain: Not on file   Food Insecurity: Not on file   Transportation Needs: Not on file   Physical Activity: Not on file   Stress: Not on file   Social Connections: Not on file   Intimate Partner Violence: Not on file   Housing Stability: Not on file       Active Ambulatory Problems     Diagnosis Date Noted     Thyroid nodule 09/15/2017     Vitamin D deficiency 01/04/2018     Elevated sed rate 01/04/2018     External hemorrhoid 02/01/2018     Prediabetes 04/26/2018     Resolved Ambulatory Problems     Diagnosis Date Noted     No Resolved Ambulatory Problems     No Additional Past Medical History       No family history on file.    Objective:     /60 (BP Location: Right arm, Patient Position: Sitting, Cuff Size: Adult Regular)   Pulse 60   Wt 83.6 kg (184 lb 3.2 oz)   BMI 32.63 kg/m      Patient is alert, in no obvious distress.   Skin: Warm, dry.  No lesions or rashes.  Skin turgor rapid return.   HEENT:  Head normocephalic, atraumatic.  Eyes normal.  PERRL.  EOM's intact.  No nystagmus. Ears normal.   Nose patent, mucosa pink.  Oropharynx mucosa pink.  No lesions or tonsillar enlargement.   Neck: Supple, no lymphadenopathy.  Thyroid is enlarged without discrete nodularities.   Lungs:  Clear to auscultation. Respirations even and unlabored.  No wheezing or rales noted.   Heart:  Regular rate and rhythm.    Musculoskeletal: She has full range of motion of her extremities.  There are no areas of erythema, ecchymosis, signs of trauma on the hands or the feet.  Tinel's and Phalen's are negative.  Sensations intact within her lower extremities.  She ambulates without difficulty.    LABORATORY: I ordered and personally reviewed left hand x-rays showing no obvious fracture.  Will have radiology review.

## 2021-12-01 ENCOUNTER — TELEPHONE (OUTPATIENT)
Dept: FAMILY MEDICINE | Facility: CLINIC | Age: 56
End: 2021-12-01
Payer: COMMERCIAL

## 2021-12-01 NOTE — TELEPHONE ENCOUNTER
----- Message from COLBY Georges CNP sent at 11/30/2021  8:14 PM CST -----  Please notify the patient that her hand x-ray shows no fracture.  Thanks.

## 2021-12-02 LAB
ANA PAT SER IF-IMP: ABNORMAL
ANA SER QL IF: ABNORMAL
ANA TITR SER IF: ABNORMAL {TITER}

## 2021-12-04 DIAGNOSIS — R76.8 ELEVATED ANTINUCLEAR ANTIBODY (ANA) LEVEL: ICD-10-CM

## 2021-12-04 DIAGNOSIS — M79.642 PAIN OF LEFT HAND: ICD-10-CM

## 2021-12-04 DIAGNOSIS — R20.2 PARESTHESIA: Primary | ICD-10-CM

## 2021-12-06 ENCOUNTER — TELEPHONE (OUTPATIENT)
Dept: FAMILY MEDICINE | Facility: CLINIC | Age: 56
End: 2021-12-06
Payer: COMMERCIAL

## 2021-12-06 NOTE — TELEPHONE ENCOUNTER
----- Message from COLBY Georges CNP sent at 12/4/2021  7:39 PM CST -----  Please notify the patient that her TYRESE is mildly elevated.  This can be a sign of an autoimmune disease.  I placed a referral to rheumatology for further evaluation.  Secondly, her hemoglobin is a little low.  We will continue to monitor this. Thanks.

## 2021-12-10 ENCOUNTER — TRANSFERRED RECORDS (OUTPATIENT)
Dept: HEALTH INFORMATION MANAGEMENT | Facility: CLINIC | Age: 56
End: 2021-12-10
Payer: COMMERCIAL

## 2022-01-25 ENCOUNTER — TRANSFERRED RECORDS (OUTPATIENT)
Dept: HEALTH INFORMATION MANAGEMENT | Facility: CLINIC | Age: 57
End: 2022-01-25
Payer: COMMERCIAL

## 2022-01-29 ENCOUNTER — HOSPITAL ENCOUNTER (EMERGENCY)
Facility: HOSPITAL | Age: 57
End: 2022-01-29
Payer: COMMERCIAL

## 2022-01-29 ENCOUNTER — OFFICE VISIT (OUTPATIENT)
Dept: FAMILY MEDICINE | Facility: CLINIC | Age: 57
End: 2022-01-29
Payer: COMMERCIAL

## 2022-01-29 ENCOUNTER — HOSPITAL ENCOUNTER (OUTPATIENT)
Dept: ULTRASOUND IMAGING | Facility: HOSPITAL | Age: 57
Discharge: HOME | End: 2022-01-29
Attending: NURSE PRACTITIONER | Admitting: NURSE PRACTITIONER
Payer: COMMERCIAL

## 2022-01-29 VITALS
DIASTOLIC BLOOD PRESSURE: 72 MMHG | TEMPERATURE: 97.4 F | HEART RATE: 57 BPM | SYSTOLIC BLOOD PRESSURE: 115 MMHG | OXYGEN SATURATION: 99 %

## 2022-01-29 DIAGNOSIS — I83.812 VARICOSE VEINS OF LEFT LOWER EXTREMITY WITH PAIN: Primary | ICD-10-CM

## 2022-01-29 DIAGNOSIS — M79.662 PAIN OF LEFT LOWER LEG: ICD-10-CM

## 2022-01-29 PROCEDURE — 93971 EXTREMITY STUDY: CPT | Mod: LT

## 2022-01-29 PROCEDURE — 99214 OFFICE O/P EST MOD 30 MIN: CPT | Performed by: NURSE PRACTITIONER

## 2022-01-29 ASSESSMENT — ENCOUNTER SYMPTOMS
PALPITATIONS: 0
MYALGIAS: 1
DIZZINESS: 0
FATIGUE: 0
SHORTNESS OF BREATH: 0

## 2022-01-29 NOTE — PROGRESS NOTES
Patient presents with:  Musculoskeletal Problem: Over vericose vein.  Started yesterday.  Concerd for blood clot.     Clinical Decision Making: Focused exam positive for localized left lower leg proximal to the knee with varicose vein that is tender to palpation, negative pain in calf with flexion and extension, calf soft to palpation without pain, strength 5 out of 5, pulses 2+. Negative pain in left calf with mobilization in exam room. Low suspicion for possible DVT given clinical presentation, however patient request further evaluation with ultrasound to rule out DVT.  Patient advised of limitation with no ultrasound onsite at this time, therefore will send to [facility] for imaging with a hold and call. Negative for DVT or superficial clot, results reviewed with patient.  Encourage compression stockings and follow-up with primary care provider for management of varicose veins.      ICD-10-CM    1. Varicose veins of left lower extremity with pain  I83.812    2. Pain of left lower leg  M79.662 US Lower Extremity Venous Duplex Left       Patient Instructions     Patient Education     Self-Care for Spider and Varicose Veins  Your healthcare provider may suggest that you try self-care. Exercising and maintaining a healthy weight may keep problem veins from getting worse. Wearing elastic stockings and elevating your legs can help improve blood flow. Taking breaks when you sit or stand helps, too.    Exercising  Exercising is good for your veins because it improves blood flow. Walking, cycling, or swimming are great exercises for vein health. But be sure to check with your healthcare provider before starting any exercise program. Also keep these hints in mind:    When exercising, start out slowly and try to build up to 30 minutes on most days.    Elevate your legs above heart level after exercise to keep blood from pooling in veins.  Staying at a healthy weight  Being overweight puts extra pressure on your veins. To  stay at a healthy weight, try these tips:    Choose lean meats, fish, and skinless chicken.    Use low-fat dairy products.    Eat foods high in fiber, such as whole grains, fruits, and vegetables.    Cut down on sugar, salt, and saturated and trans fats.    Exercise regularly.  Wearing elastic stockings  Elastic stockings gently squeeze veins so blood flows upward. If you need elastic stockings, your healthcare provider can prescribe them for you. Follow your healthcare provider s advice about how and when to wear them. Elastic stockings come in several different levels of pressure. Ask your healthcare provider which level of pressure would help you the most.   Elevating your legs  Raising your legs above heart level will help relieve swelling and keep blood from pooling in veins. Try to elevate your legs for 15 to 20 minutes at the end of the day, and whenever you re relaxing. To make sure your legs are raised above heart level, prop them up on cushions or large pillows.  When sitting and standing  To keep blood moving when you have to sit or stand for long periods, try these tips:    At work, take walking breaks instead of coffee breaks. Walk during your lunch hour. Or try flexing your feet up and down 10 times each hour.    When standing, raise yourself up and down on your toes, or rock back and forth on your heels.  [email or web address] last reviewed this educational content on 11/1/2019 2000-2021 [employer]. All rights reserved. This information is not intended as a substitute for professional medical care. Always follow your healthcare professional's instructions.           Patient Education     Understanding Spider and Varicose Veins  What are the symptoms?  Spider veins or varicose veins may never be a problem. But sometimes they can cause legs to ache or swell. Your legs may also feel heavy and tired. Or they may feel like they re burning. These symptoms may be more severe at the end of the day.  "Prolonged sitting or standing can also make your symptoms worse.  Who gets spider and varicose veins?  Anyone can get spider or varicose veins. But vein problems tend to be hereditary (run in families). Other factors that can affect veins include:    Pregnancy, hormones, and birth control pills    A job where you stand or sit a lot    Extra weight or lack of exercise    Age    Do you often hide your legs because of the way they look? You may have noticed tiny red or blue bursts (spider veins). Or maybe you have veins that bulge or look twisted (varicose veins). If so, there are treatments that can help.  What can be done?  Spider and varicose veins can affect the way you feel about yourself. Talk to your healthcare provider about your concerns. There are treatments that can ease symptoms and make your legs look better.  Your treatment choices  Treatment may include:    Self-care. Being physically active can help with blood flow in your legs. So, too, can losing weight, if needed, and wearing compression stockings.    Sclerotherapy. During this treatment, a chemical is injected into the veins. It can work for spider veins and some varicose veins.    Newer minimally invasive procedures or surgery (in rare cases). These treatments may be needed for large varicose veins.     [employer] last reviewed this educational content on 4/1/2019 2000-2021 [employer]. All rights reserved. This information is not intended as a substitute for professional medical care. Always follow your healthcare professional's instructions.         Please schedule follow-up appointment with your primary care provider in 2 to 3 weeks.      HPI: Meera Grier is a 56 year old female prediabetes, history of elevated sed rate, who presents today complaining of left leg with pain over varicose vein for the past 1 day.  Reports pain is a constant \"achy\" discomfort that she rates a 5 out of 10 on the numeric pain scale.  Patient " does endorse sitting long hours at work, however does hydrate with water throughout the day and takes Epsom bath soaks nightly. No OTC medication was taken.  Denies any personal past medical history or family history of blood clots. Denies any recent trauma or injury to extremity.Denies any shortness of breath, dizziness, or chest pain.    History obtained from the patient patient.  LMP: Postmenopausal approximately 2 years.      Problem List:  2018-04: Prediabetes  2018-02: External hemorrhoid  2018-01: Vitamin D deficiency  2018-01: Elevated sed rate  2017-09: Thyroid nodule      No past medical history on file.    Social History     Tobacco Use     Smoking status: Never Smoker     Smokeless tobacco: Never Used   Substance Use Topics     Alcohol use: No       Review of Systems   Constitutional: Negative for fatigue.   Respiratory: Negative for shortness of breath.    Cardiovascular: Negative for chest pain, palpitations and leg swelling.   Musculoskeletal: Positive for myalgias.        Left lower leg   Neurological: Negative for dizziness.       Vitals:    01/29/22 1657   BP: 115/72   BP Location: Right arm   Patient Position: Sitting   Cuff Size: Adult Regular   Pulse: 57   Temp: 97.4  F (36.3  C)   TempSrc: Tympanic   SpO2: 99%       Physical Exam  Constitutional:       Appearance: Normal appearance. She is not ill-appearing or diaphoretic.   Cardiovascular:      Rate and Rhythm: Normal rate.      Pulses: Normal pulses.      Heart sounds: Normal heart sounds.   Pulmonary:      Effort: Pulmonary effort is normal.      Breath sounds: Normal breath sounds.   Musculoskeletal:         General: Tenderness present. No swelling or signs of injury. Normal range of motion.      Comments: Localized left lower leg proximal to the knee with varicose vein that is tender to palpation, negative pain in calf with flexion and extension, calf soft to palpation without pain, FROM, strength 5 out of 5, and pulses 2+.    Skin:      General: Skin is warm.      Capillary Refill: Capillary refill takes less than 2 seconds.      Comments: Left leg noted with varicose vein proximal to knee, lateral and medially.   Neurological:      Mental Status: She is alert and oriented to person, place, and time.      Sensory: No sensory deficit.      Motor: No weakness.      Gait: Gait normal.      Deep Tendon Reflexes: Reflexes normal.   Psychiatric:         Behavior: Behavior normal.         Labs:  Results for orders placed or performed during the hospital encounter of 01/29/22   US Lower Extremity Venous Duplex Left     Status: None    Narrative    EXAM: US LOWER EXTREMITY VENOUS DUPLEX LEFT  LOCATION: [facility]  DATE/TIME: 1/29/2022 6:30 PM    INDICATION: Left lower extremity lateral tenderness along varicose vein.  Family Hx of DVT.  Patient concern of possible DVT.  COMPARISON: 07/09/2021  TECHNIQUE: Venous Duplex ultrasound of the left lower extremity with and without compression, augmentation and duplex. Color flow and spectral Doppler with waveform analysis performed.    FINDINGS: Exam includes the common femoral, femoral, popliteal, and contralateral common femoral veins as well as segmentally visualized deep calf veins and greater saphenous vein.     LEFT: No deep vein thrombosis. Area of pain/tenderness corresponds to a patent varicose vein. No superficial thrombophlebitis. No popliteal cyst.      Impression    IMPRESSION:  1.  Negative left leg venous Doppler.         At the end of the encounter, I discussed results, diagnosis, medications. Discussed red flags for immediate return to clinic/ER, as well as indications for follow up if no improvement. Patient understood and agreed to plan.     COLBY Seo CNP

## 2022-01-30 NOTE — PATIENT INSTRUCTIONS
Patient Education     Self-Care for Spider and Varicose Veins  Your healthcare provider may suggest that you try self-care. Exercising and maintaining a healthy weight may keep problem veins from getting worse. Wearing elastic stockings and elevating your legs can help improve blood flow. Taking breaks when you sit or stand helps, too.    Exercising  Exercising is good for your veins because it improves blood flow. Walking, cycling, or swimming are great exercises for vein health. But be sure to check with your healthcare provider before starting any exercise program. Also keep these hints in mind:    When exercising, start out slowly and try to build up to 30 minutes on most days.    Elevate your legs above heart level after exercise to keep blood from pooling in veins.  Staying at a healthy weight  Being overweight puts extra pressure on your veins. To stay at a healthy weight, try these tips:    Choose lean meats, fish, and skinless chicken.    Use low-fat dairy products.    Eat foods high in fiber, such as whole grains, fruits, and vegetables.    Cut down on sugar, salt, and saturated and trans fats.    Exercise regularly.  Wearing elastic stockings  Elastic stockings gently squeeze veins so blood flows upward. If you need elastic stockings, your healthcare provider can prescribe them for you. Follow your healthcare provider s advice about how and when to wear them. Elastic stockings come in several different levels of pressure. Ask your healthcare provider which level of pressure would help you the most.   Elevating your legs  Raising your legs above heart level will help relieve swelling and keep blood from pooling in veins. Try to elevate your legs for 15 to 20 minutes at the end of the day, and whenever you re relaxing. To make sure your legs are raised above heart level, prop them up on cushions or large pillows.  When sitting and standing  To keep blood moving when you have to sit or stand for long  periods, try these tips:    At work, take walking breaks instead of coffee breaks. Walk during your lunch hour. Or try flexing your feet up and down 10 times each hour.    When standing, raise yourself up and down on your toes, or rock back and forth on your heels.  Maria D last reviewed this educational content on 11/1/2019 2000-2021 [employer]. All rights reserved. This information is not intended as a substitute for professional medical care. Always follow your healthcare professional's instructions.           Patient Education     Understanding Spider and Varicose Veins  What are the symptoms?  Spider veins or varicose veins may never be a problem. But sometimes they can cause legs to ache or swell. Your legs may also feel heavy and tired. Or they may feel like they re burning. These symptoms may be more severe at the end of the day. Prolonged sitting or standing can also make your symptoms worse.  Who gets spider and varicose veins?  Anyone can get spider or varicose veins. But vein problems tend to be hereditary (run in families). Other factors that can affect veins include:    Pregnancy, hormones, and birth control pills    A job where you stand or sit a lot    Extra weight or lack of exercise    Age    Do you often hide your legs because of the way they look? You may have noticed tiny red or blue bursts (spider veins). Or maybe you have veins that bulge or look twisted (varicose veins). If so, there are treatments that can help.  What can be done?  Spider and varicose veins can affect the way you feel about yourself. Talk to your healthcare provider about your concerns. There are treatments that can ease symptoms and make your legs look better.  Your treatment choices  Treatment may include:    Self-care. Being physically active can help with blood flow in your legs. So, too, can losing weight, if needed, and wearing compression stockings.    Sclerotherapy. During this treatment, a  chemical is injected into the veins. It can work for spider veins and some varicose veins.    Newer minimally invasive procedures or surgery (in rare cases). These treatments may be needed for large varicose veins.     Maria D last reviewed this educational content on 4/1/2019 2000-2021 [employer]. All rights reserved. This information is not intended as a substitute for professional medical care. Always follow your healthcare professional's instructions.         Please schedule follow-up appointment with your primary care provider in 2 to 3 weeks.

## 2022-02-09 ENCOUNTER — TRANSFERRED RECORDS (OUTPATIENT)
Dept: HEALTH INFORMATION MANAGEMENT | Facility: CLINIC | Age: 57
End: 2022-02-09
Payer: COMMERCIAL

## 2022-02-17 ENCOUNTER — TRANSFERRED RECORDS (OUTPATIENT)
Dept: HEALTH INFORMATION MANAGEMENT | Facility: CLINIC | Age: 57
End: 2022-02-17

## 2022-02-17 ENCOUNTER — VIRTUAL VISIT (OUTPATIENT)
Dept: RHEUMATOLOGY | Facility: CLINIC | Age: 57
End: 2022-02-17
Attending: NURSE PRACTITIONER
Payer: COMMERCIAL

## 2022-02-17 DIAGNOSIS — M25.562 CHRONIC PAIN OF LEFT KNEE: ICD-10-CM

## 2022-02-17 DIAGNOSIS — G89.29 CHRONIC PAIN OF LEFT KNEE: ICD-10-CM

## 2022-02-17 DIAGNOSIS — R76.8 ELEVATED ANTINUCLEAR ANTIBODY (ANA) LEVEL: Primary | ICD-10-CM

## 2022-02-17 DIAGNOSIS — M79.605 LEFT LEG PAIN: ICD-10-CM

## 2022-02-17 DIAGNOSIS — D64.9 ANEMIA, UNSPECIFIED TYPE: ICD-10-CM

## 2022-02-17 DIAGNOSIS — R20.2 PARESTHESIA: ICD-10-CM

## 2022-02-17 DIAGNOSIS — M79.642 PAIN OF LEFT HAND: ICD-10-CM

## 2022-02-17 PROCEDURE — 99204 OFFICE O/P NEW MOD 45 MIN: CPT | Mod: 95 | Performed by: INTERNAL MEDICINE

## 2022-02-17 NOTE — PATIENT INSTRUCTIONS
Summary of Your Rheumatology Visit    Next Appointment: Approximately 2 Months    Medications:    For joint pain or muscle aches involving left lower extremity, suggest trying Tylenol 500-1000 mg once or twice a day on a when necessary basis..     Referrals:      Tests:     Please have labs that were ordered performed.       Injections:      Other:

## 2022-02-17 NOTE — PROGRESS NOTES
Meera KIRKLAND Marvel who presents today with a chief complaint of  No chief complaint on file.      Joint Pains: Yes  Location: knee  Onset: months  Intensity: 0 /10  AM Stiffness: none  Alleviating/Aggravating Factors: stairs Medications helpful?  Tolerating Meds: Yes  Other:      ROS:  Patient denies having: persistent dry eyes, dry mouth, recurrent oral ulcers, patchy alopecia, active rashes, photosensitivity, history of psoriasis, active chest pain, active shortness of breath, active cough, active dysuria, history of kidney stones, active abdominal pain, active diarrhea, history of hematochezia, active dysphagia, history of peptic ulcer disease, history of HIV, tuberculosis, hepatitis B or C, Lyme disease, seizure history, raynaud's, active documented fevers, recent infections, difficulty sleeping or chronic unrefreshing sleep, involuntary weight loss, loss of appetite, excessive fatigue, depression, anxiety,  recurrent sinus infections, history of inflammatory eye diseases (such as uveitis, scleritis, iritis, etc).       Information gathered by medical assistant incorporated into this note, was reviewed and discussed with the patient.    Problem List:  Patient Active Problem List   Diagnosis     Thyroid nodule     Vitamin D deficiency     Elevated sed rate     External hemorrhoid     Prediabetes        PMH:   No past medical history on file.    Surgical History:  Past Surgical History:   Procedure Laterality Date     TUBAL LIGATION       US BIOPSY THYROID FINE NEEDLE ASPIRATION  3/31/2020       Family History:  No family history on file.    Social History:   reports that she has never smoked. She has never used smokeless tobacco. She reports that she does not drink alcohol and does not use drugs.    Allergies:  No Known Allergies     Current Medications:  Current Outpatient Medications   Medication Sig Dispense Refill     albuterol (PROAIR HFA;PROVENTIL HFA;VENTOLIN HFA) 90 mcg/actuation inhaler [ALBUTEROL  "(PROAIR HFA;PROVENTIL HFA;VENTOLIN HFA) 90 MCG/ACTUATION INHALER] Inhale 2 puffs every 6 (six) hours as needed. 1 each 0     cholecalciferol 25 MCG (1000 UT) TABS Take 2,000 Units by mouth daily 90 tablet 3     hydrOXYzine pamoate (VISTARIL) 50 MG capsule [HYDROXYZINE PAMOATE (VISTARIL) 50 MG CAPSULE] Take 1-2 capsules ( mg total) by mouth at bedtime as needed. (Patient not taking: Reported on 1/29/2022) 60 capsule 2     olopatadine (PATANOL) 0.1 % ophthalmic solution [OLOPATADINE (PATANOL) 0.1 % OPHTHALMIC SOLUTION] Administer 1 drop to both eyes 2 (two) times a day. (Patient not taking: Reported on 11/30/2021) 5 mL 1           Physical Exam:  Following up today via video visit, per Covid-19 pandemic requirements.    Verbal consent has been obtained for this service by care team member.    Video call start time: 3:41 PM    Video call end time: 4:00 PM    D[employer] utilized for video call.    Phone number utilized: [phone]     Patient location for video visit: Home     Provider location for video visit:  Home (working remotely)        Summary/Assessment:    Pleasant 56-year-old female presents with positive TYRESE and left lower extremity pains.    Patient states that for the past few months has been experiencing left lower extremity pains, sometimes involving left knee when going up and down the stairs and sometimes involving her distal left lower extremity muscle \"'over varicose veins\".    Had left lower extremity ultrasound which was negative for DVT.    Has not seen vascular for her varicose veins.    Currently not taking any medications even over-the-counter meds for her left lower extremity pains.  States on the mild side however does periodically resurface.    Denies family history for connective tissue disease.    Denies personal or family history for psoriasis.    Denies having joint swelling.    Denies history of blood clots.    Denies history of miscarriages.    Denies having radiating pain from " low back down left lower extremity or paresthesias involving her legs.    About a week ago had carpal tunnel surgery for left hand paresthesias, states surgery went well and no complications.    Noted to have mild anemia with normal B12 and ferritin levels.    Given the above, difficult to tell with certainty at this time as to what the primary source is, contributing to symptoms described.  Given positive TYRESE and symptoms described, we will obtain some autoimmune markers along with other labs to screen for possible underlying etiologies.    Please see below for management plan.    Pertinent rheumatology/past medical history (please refer to above for more detailed history):      Positive TYRESE (1-80 titer with speckled pattern)    Left knee pain, episodic    Left distal lower extremity pain, episodic (attributes to varicose veins)    Anemia, mild (normal ferritin and B12)    Left carpal tunnel, status post surgical intervention (2/2022)    Postmenopausal      Rheumatology medications provided/suggested:    Tylenol      Pertinent medication from other providers or from otc (please refer to above for more detailed med list):    Vitamin D    Pertinent medications already tried:           Pertinent lab history:    Positive/elevated: TYRESE, ESR (fine when corrected for age)    Negative/unremarkable: Rheumatoid factor, ferritin, B12, TSH, hemoglobin A1c      Pertinent imaging/test history:    Left lower extremity ultrasound negative for DVT       Other:    Marital status:  single     How many kids:  4    Type of work:  Yes, reproduction tech     Drinking alcohol: no    Tobacco use: no      Recreational drug use: no    Active contraceptive: n/a     History hysterectomy: no     Tubal ligation: yes         Plan:      For joint pain or muscle aches involving left lower extremity, suggested she take Tylenol 500-1000 mg once or twice a day, as needed.    States has some discomfort involving varicose veins come and go, deferred  referral to vascular.    For occasional left knee pains when going up and down the stairs offered referral to PT, deferred at this time.    Established with hand surgeon for left carpal tunnel symptoms.    Will obtain some labs and correlate clinically.    Follow-up in 2 months.      Procedure note:     Major side effect profile of medications provided/suggested were discussed with the patient.    This note was transcribed using Dragon voice recognition software as a result unintentional grammatical errors or word substitutions may have occurred. Please contact our Rheumatology department if you need any clarification or if you have any related inquiries.    Thank you for referring this patient to our clinic.      Yevgeniy Gaxiola DO  ...................  2/17/2022   2:49 PM

## 2022-03-04 ENCOUNTER — LAB (OUTPATIENT)
Dept: LAB | Facility: CLINIC | Age: 57
End: 2022-03-04
Payer: COMMERCIAL

## 2022-03-04 DIAGNOSIS — G89.29 CHRONIC PAIN OF LEFT KNEE: ICD-10-CM

## 2022-03-04 DIAGNOSIS — D64.9 ANEMIA, UNSPECIFIED TYPE: ICD-10-CM

## 2022-03-04 DIAGNOSIS — M79.605 LEFT LEG PAIN: ICD-10-CM

## 2022-03-04 DIAGNOSIS — R76.8 ELEVATED ANTINUCLEAR ANTIBODY (ANA) LEVEL: ICD-10-CM

## 2022-03-04 DIAGNOSIS — M25.562 CHRONIC PAIN OF LEFT KNEE: ICD-10-CM

## 2022-03-04 LAB
ALBUMIN UR-MCNC: NEGATIVE MG/DL
ALT SERPL W P-5'-P-CCNC: 18 U/L (ref 0–45)
APPEARANCE UR: CLEAR
AST SERPL W P-5'-P-CCNC: 21 U/L (ref 0–40)
BACTERIA #/AREA URNS HPF: ABNORMAL /HPF
BASOPHILS # BLD AUTO: 0 10E3/UL (ref 0–0.2)
BASOPHILS NFR BLD AUTO: 1 %
BILIRUB UR QL STRIP: NEGATIVE
C REACTIVE PROTEIN LHE: 0.9 MG/DL (ref 0–0.8)
C3 SERPL-MCNC: 158 MG/DL (ref 83–177)
C4 SERPL-MCNC: 38 MG/DL (ref 19–59)
CK SERPL-CCNC: 217 U/L (ref 30–190)
COLOR UR AUTO: YELLOW
CREAT SERPL-MCNC: 0.78 MG/DL (ref 0.6–1.1)
CREAT UR-MCNC: 153 MG/DL
EOSINOPHIL # BLD AUTO: 0.2 10E3/UL (ref 0–0.7)
EOSINOPHIL NFR BLD AUTO: 4 %
ERYTHROCYTE [DISTWIDTH] IN BLOOD BY AUTOMATED COUNT: 11.9 % (ref 10–15)
ERYTHROCYTE [SEDIMENTATION RATE] IN BLOOD BY WESTERGREN METHOD: 31 MM/HR (ref 0–20)
GFR SERPL CREATININE-BSD FRML MDRD: 90 ML/MIN/1.73M2
GLUCOSE UR STRIP-MCNC: NEGATIVE MG/DL
HCT VFR BLD AUTO: 35.8 % (ref 35–47)
HGB BLD-MCNC: 11.8 G/DL (ref 11.7–15.7)
HGB UR QL STRIP: NEGATIVE
IMM GRANULOCYTES # BLD: 0 10E3/UL
IMM GRANULOCYTES NFR BLD: 0 %
KETONES UR STRIP-MCNC: NEGATIVE MG/DL
LEUKOCYTE ESTERASE UR QL STRIP: NEGATIVE
LYMPHOCYTES # BLD AUTO: 2.1 10E3/UL (ref 0.8–5.3)
LYMPHOCYTES NFR BLD AUTO: 40 %
MCH RBC QN AUTO: 31 PG (ref 26.5–33)
MCHC RBC AUTO-ENTMCNC: 33 G/DL (ref 31.5–36.5)
MCV RBC AUTO: 94 FL (ref 78–100)
MICROALBUMIN UR-MCNC: <0.5 MG/DL (ref 0–1.99)
MICROALBUMIN/CREAT UR: NORMAL MG/G{CREAT}
MONOCYTES # BLD AUTO: 0.4 10E3/UL (ref 0–1.3)
MONOCYTES NFR BLD AUTO: 8 %
NEUTROPHILS # BLD AUTO: 2.4 10E3/UL (ref 1.6–8.3)
NEUTROPHILS NFR BLD AUTO: 47 %
NITRATE UR QL: NEGATIVE
PH UR STRIP: 5.5 [PH] (ref 5–8)
PLATELET # BLD AUTO: 344 10E3/UL (ref 150–450)
RBC # BLD AUTO: 3.81 10E6/UL (ref 3.8–5.2)
RBC #/AREA URNS AUTO: ABNORMAL /HPF
SP GR UR STRIP: 1.02 (ref 1–1.03)
SQUAMOUS #/AREA URNS AUTO: ABNORMAL /LPF
URATE SERPL-MCNC: 5.2 MG/DL (ref 2–7.5)
UROBILINOGEN UR STRIP-ACNC: 0.2 E.U./DL
WBC # BLD AUTO: 5.2 10E3/UL (ref 4–11)
WBC #/AREA URNS AUTO: ABNORMAL /HPF

## 2022-03-04 PROCEDURE — 85025 COMPLETE CBC W/AUTO DIFF WBC: CPT

## 2022-03-04 PROCEDURE — 81001 URINALYSIS AUTO W/SCOPE: CPT

## 2022-03-04 PROCEDURE — 36415 COLL VENOUS BLD VENIPUNCTURE: CPT

## 2022-03-04 PROCEDURE — 82043 UR ALBUMIN QUANTITATIVE: CPT

## 2022-03-04 PROCEDURE — 82565 ASSAY OF CREATININE: CPT

## 2022-03-04 PROCEDURE — 82550 ASSAY OF CK (CPK): CPT

## 2022-03-04 PROCEDURE — 85730 THROMBOPLASTIN TIME PARTIAL: CPT

## 2022-03-04 PROCEDURE — 86160 COMPLEMENT ANTIGEN: CPT | Mod: 59

## 2022-03-04 PROCEDURE — 85390 FIBRINOLYSINS SCREEN I&R: CPT | Performed by: PATHOLOGY

## 2022-03-04 PROCEDURE — 86200 CCP ANTIBODY: CPT

## 2022-03-04 PROCEDURE — 86140 C-REACTIVE PROTEIN: CPT

## 2022-03-04 PROCEDURE — 86147 CARDIOLIPIN ANTIBODY EA IG: CPT | Mod: 91

## 2022-03-04 PROCEDURE — 85652 RBC SED RATE AUTOMATED: CPT

## 2022-03-04 PROCEDURE — 85613 RUSSELL VIPER VENOM DILUTED: CPT

## 2022-03-04 PROCEDURE — 84460 ALANINE AMINO (ALT) (SGPT): CPT

## 2022-03-04 PROCEDURE — 86235 NUCLEAR ANTIGEN ANTIBODY: CPT | Mod: 59

## 2022-03-04 PROCEDURE — 86225 DNA ANTIBODY NATIVE: CPT

## 2022-03-04 PROCEDURE — 84450 TRANSFERASE (AST) (SGOT): CPT

## 2022-03-04 PROCEDURE — 86147 CARDIOLIPIN ANTIBODY EA IG: CPT

## 2022-03-04 PROCEDURE — 84550 ASSAY OF BLOOD/URIC ACID: CPT

## 2022-03-04 PROCEDURE — 86160 COMPLEMENT ANTIGEN: CPT

## 2022-03-07 LAB
DRVVT SCREEN RATIO: 0.97
INR PPP: 0.98 (ref 0.85–1.15)
LA PPP-IMP: NEGATIVE
LUPUS INTERPRETATION: NORMAL
PTT RATIO: 1.05
THROMBIN TIME: 16.4 SECONDS (ref 13–19)

## 2022-03-08 LAB
CARDIOLIPIN IGA SER IA-ACNC: 4.4 APL-U/ML
CARDIOLIPIN IGA SER IA-ACNC: NEGATIVE
CARDIOLIPIN IGG SER IA-ACNC: <2 GPL-U/ML
CARDIOLIPIN IGG SER IA-ACNC: NEGATIVE
CARDIOLIPIN IGM SER IA-ACNC: <2 MPL-U/ML
CARDIOLIPIN IGM SER IA-ACNC: NEGATIVE
CCP AB SER IA-ACNC: 1 U/ML
DSDNA AB SER-ACNC: 1.8 IU/ML
ENA SM IGG SER IA-ACNC: 1.9 U/ML
ENA SM IGG SER IA-ACNC: NEGATIVE
ENA SS-A AB SER IA-ACNC: 0.6 U/ML
ENA SS-A AB SER IA-ACNC: NEGATIVE
ENA SS-B IGG SER IA-ACNC: 0.6 U/ML
ENA SS-B IGG SER IA-ACNC: NEGATIVE
U1 SNRNP IGG SER IA-ACNC: 1.7 U/ML
U1 SNRNP IGG SER IA-ACNC: NEGATIVE

## 2022-03-24 ENCOUNTER — TELEPHONE (OUTPATIENT)
Dept: RHEUMATOLOGY | Facility: CLINIC | Age: 57
End: 2022-03-24

## 2022-03-24 NOTE — TELEPHONE ENCOUNTER
"LVMTCB and per MD message below.       Per Dr. Gaxiola,  \"Unfortunately as per policy needs to be video call, There is no option for phone call. If desires we can add to 6 PM this evening. Patient only seen once before, cannot perform phone visit, pls inform patient. Otherwise, needs top reschedule.\"      Office # [phone] was provided.  "

## 2022-04-29 DIAGNOSIS — H10.13 ALLERGIC CONJUNCTIVITIS, BILATERAL: ICD-10-CM

## 2022-05-02 RX ORDER — OLOPATADINE HYDROCHLORIDE 1 MG/ML
SOLUTION/ DROPS OPHTHALMIC
Qty: 5 ML | Refills: 1 | Status: SHIPPED | OUTPATIENT
Start: 2022-05-02 | End: 2022-09-06

## 2022-05-02 NOTE — TELEPHONE ENCOUNTER
"Routing refill request to provider for review/approval because:  A break in medication    Last Written Prescription Date:  10/20/20  Last Fill Quantity: 5 ml,  # refills: 1   Last office visit provider:  11/30/21     Requested Prescriptions   Pending Prescriptions Disp Refills     olopatadine (PATANOL) 0.1 % ophthalmic solution [Pharmacy Med Name: OLOPATADINE 0.1% OPTH SOLN 5ML] 5 mL 1     Sig: INSTILL 1 DROP INTO BOTH EYES TWICE DAILY       Miscellaneous Opthalmic Allergy Drops Protocol Passed - 5/2/2022  2:20 PM        Passed - Patient is age 4 or older        Passed - Recent (12 mo) or future (30 days) visit within the authorizing provider's specialty     Patient has had an office visit with the authorizing provider or a provider within the authorizing providers department within the previous 12 mos or has a future within next 30 days. See \"Patient Info\" tab in inbasket, or \"Choose Columns\" in Meds & Orders section of the refill encounter.              Passed - Medication is active on med list        Passed - Patient is not pregnant        Passed - No positive pregnancy test on record in past 12 mos             Yan Retana RN 05/02/22 2:21 PM    "

## 2022-08-05 ENCOUNTER — HOSPITAL ENCOUNTER (OUTPATIENT)
Dept: MAMMOGRAPHY | Facility: CLINIC | Age: 57
Discharge: HOME | End: 2022-08-05
Attending: NURSE PRACTITIONER | Admitting: NURSE PRACTITIONER
Payer: COMMERCIAL

## 2022-08-05 DIAGNOSIS — Z12.31 ENCOUNTER FOR SCREENING MAMMOGRAM FOR BREAST CANCER: ICD-10-CM

## 2022-08-05 PROCEDURE — 77067 SCR MAMMO BI INCL CAD: CPT

## 2022-09-01 ENCOUNTER — TELEPHONE (OUTPATIENT)
Dept: FAMILY MEDICINE | Facility: CLINIC | Age: 57
End: 2022-09-01

## 2022-09-01 NOTE — TELEPHONE ENCOUNTER
General Call      Reason for Call:  Pt calling stating she is gaining weight rapidly - does not know the cause  Pt stating her ankles are swelling    Please advise    What are your questions or concerns:  n/a    Date of last appointment with provider: n/a    Okay to leave a detailed message?: Yes at Home number on file [phone] (home)

## 2022-09-06 ENCOUNTER — OFFICE VISIT (OUTPATIENT)
Dept: FAMILY MEDICINE | Facility: CLINIC | Age: 57
End: 2022-09-06
Payer: COMMERCIAL

## 2022-09-06 VITALS
BODY MASS INDEX: 34.58 KG/M2 | OXYGEN SATURATION: 98 % | DIASTOLIC BLOOD PRESSURE: 60 MMHG | RESPIRATION RATE: 20 BRPM | HEART RATE: 69 BPM | WEIGHT: 195.2 LBS | SYSTOLIC BLOOD PRESSURE: 118 MMHG

## 2022-09-06 DIAGNOSIS — E04.1 THYROID NODULE: Primary | ICD-10-CM

## 2022-09-06 DIAGNOSIS — F32.1 CURRENT MODERATE EPISODE OF MAJOR DEPRESSIVE DISORDER WITHOUT PRIOR EPISODE (H): ICD-10-CM

## 2022-09-06 DIAGNOSIS — E66.09 CLASS 1 OBESITY DUE TO EXCESS CALORIES WITHOUT SERIOUS COMORBIDITY WITH BODY MASS INDEX (BMI) OF 34.0 TO 34.9 IN ADULT: ICD-10-CM

## 2022-09-06 DIAGNOSIS — R73.03 PREDIABETES: ICD-10-CM

## 2022-09-06 DIAGNOSIS — R60.9 EDEMA, UNSPECIFIED TYPE: ICD-10-CM

## 2022-09-06 DIAGNOSIS — E66.811 CLASS 1 OBESITY DUE TO EXCESS CALORIES WITHOUT SERIOUS COMORBIDITY WITH BODY MASS INDEX (BMI) OF 34.0 TO 34.9 IN ADULT: ICD-10-CM

## 2022-09-06 DIAGNOSIS — R63.5 WEIGHT GAIN: ICD-10-CM

## 2022-09-06 LAB
ALBUMIN SERPL BCG-MCNC: 4.1 G/DL (ref 3.5–5.2)
ALP SERPL-CCNC: 94 U/L (ref 35–104)
ALT SERPL W P-5'-P-CCNC: 27 U/L (ref 10–35)
ANION GAP SERPL CALCULATED.3IONS-SCNC: 10 MMOL/L (ref 7–15)
AST SERPL W P-5'-P-CCNC: 50 U/L (ref 10–35)
BILIRUB SERPL-MCNC: 0.2 MG/DL
BNP SERPL-MCNC: 34 PG/ML (ref 0–80)
BUN SERPL-MCNC: 13 MG/DL (ref 6–20)
CALCIUM SERPL-MCNC: 9.4 MG/DL (ref 8.6–10)
CHLORIDE SERPL-SCNC: 106 MMOL/L (ref 98–107)
CORTIS SERPL-MCNC: 4 UG/DL
CREAT SERPL-MCNC: 1.04 MG/DL (ref 0.51–0.95)
DEPRECATED HCO3 PLAS-SCNC: 27 MMOL/L (ref 22–29)
GFR SERPL CREATININE-BSD FRML MDRD: 64 ML/MIN/1.73M2
GLUCOSE SERPL-MCNC: 93 MG/DL (ref 70–99)
HBA1C MFR BLD: 5.2 % (ref 0–5.6)
POTASSIUM SERPL-SCNC: 3.4 MMOL/L (ref 3.4–5.3)
PROT SERPL-MCNC: 7.5 G/DL (ref 6.4–8.3)
SODIUM SERPL-SCNC: 143 MMOL/L (ref 136–145)
T4 FREE SERPL-MCNC: 1.02 NG/DL (ref 0.9–1.7)
TSH SERPL DL<=0.005 MIU/L-ACNC: 1.55 UIU/ML (ref 0.3–4.2)

## 2022-09-06 PROCEDURE — 82533 TOTAL CORTISOL: CPT | Performed by: NURSE PRACTITIONER

## 2022-09-06 PROCEDURE — 83036 HEMOGLOBIN GLYCOSYLATED A1C: CPT | Performed by: NURSE PRACTITIONER

## 2022-09-06 PROCEDURE — 99214 OFFICE O/P EST MOD 30 MIN: CPT | Performed by: NURSE PRACTITIONER

## 2022-09-06 PROCEDURE — 84443 ASSAY THYROID STIM HORMONE: CPT | Performed by: NURSE PRACTITIONER

## 2022-09-06 PROCEDURE — 36415 COLL VENOUS BLD VENIPUNCTURE: CPT | Performed by: NURSE PRACTITIONER

## 2022-09-06 PROCEDURE — 86376 MICROSOMAL ANTIBODY EACH: CPT | Performed by: NURSE PRACTITIONER

## 2022-09-06 PROCEDURE — 84439 ASSAY OF FREE THYROXINE: CPT | Performed by: NURSE PRACTITIONER

## 2022-09-06 PROCEDURE — 83880 ASSAY OF NATRIURETIC PEPTIDE: CPT | Performed by: NURSE PRACTITIONER

## 2022-09-06 PROCEDURE — 80053 COMPREHEN METABOLIC PANEL: CPT | Performed by: NURSE PRACTITIONER

## 2022-09-06 ASSESSMENT — PAIN SCALES - GENERAL: PAINLEVEL: NO PAIN (0)

## 2022-09-06 ASSESSMENT — PATIENT HEALTH QUESTIONNAIRE - PHQ9
SUM OF ALL RESPONSES TO PHQ QUESTIONS 1-9: 13
SUM OF ALL RESPONSES TO PHQ QUESTIONS 1-9: 13
10. IF YOU CHECKED OFF ANY PROBLEMS, HOW DIFFICULT HAVE THESE PROBLEMS MADE IT FOR YOU TO DO YOUR WORK, TAKE CARE OF THINGS AT HOME, OR GET ALONG WITH OTHER PEOPLE: NOT DIFFICULT AT ALL

## 2022-09-06 NOTE — PROGRESS NOTES
Assessment and Plan:       ICD-10-CM    1. Thyroid nodule  E04.1 TSH     T4, free     Thyroid peroxidase antibody     US Thyroid     TSH     T4, free     Thyroid peroxidase antibody   2. Weight gain  R63.5 TSH     T4, free     Thyroid peroxidase antibody     Cortisol     TSH     T4, free     Thyroid peroxidase antibody     Cortisol   3. Class 1 obesity due to excess calories without serious comorbidity with body mass index (BMI) of 34.0 to 34.9 in adult  E66.09 Comprehensive Weight Management    Z68.34    4. Edema, unspecified type  R60.9 B-Type Natriuretic Peptide ( [facility] Only)     Comprehensive metabolic panel (BMP + Alb, Alk Phos, ALT, AST, Total. Bili, TP)     Comprehensive metabolic panel (BMP + Alb, Alk Phos, ALT, AST, Total. Bili, TP)     B-Type Natriuretic Peptide ( [facility] Only)     B-Type Natriuretic Peptide ( [facility] Only)     CANCELED: B-Type Natriuretic Peptide ( [facility] Only)   5. Prediabetes  R73.03 Hemoglobin A1c     Hemoglobin A1c   6. Current moderate episode of major depressive disorder without prior episode (H)  F32.1      Patient has known thyroid goiter.  We will check thyroid labs today.  We will also recheck thyroid ultrasound.  Will assess cortisol levels due to abdominal weight gain.  She also complains of a puffy face.  Will refer to bariatric clinic for further evaluation and treatment.  Patient may benefit from metformin or Ozempic.  She had 1 day of lower extremity swelling.  This was likely positional.  We will check renal function and BNP.  She has a history of prediabetes.  We will check A1c today.  Discussed initiating bupropion for depression which would also assist with weight loss, but she declines.  She states her depression is stemming from her weight gain.  I encouraged close follow-up if symptoms persist or worsen.  She is content with the plan.    32 minutes spent on the date of the encounter doing chart review, history and exam, documentation and further activities per the  note      Subjective:     Meera is a 56 year old female presenting to the clinic for concerns for ongoing weight gain.  Patient states she has gained 10 pounds in the past year.  She tries to eat clean and walks daily.  She complains of weight gain in her abdomen.  She has a history of thyroid goiter and had a thyroid biopsy in 2020 which was benign.  Patient feels as though her neck is enlarging.  Her voice has been hoarse.  Her mother has history of thyroid disease.  Due to the increase in abdominal fat, she has felt short of breath when she bends over.  Last week, she experienced swelling around her ankles which lasted for 1 day.  She is feeling depressed due to the weight gain.  She denies thoughts of suicide.  She has been consuming apple cider vinegar in the morning.  She is interested in seeing a weight loss clinic.  She does have a personal history of prediabetes.    Answers for HPI/ROS submitted by the patient on 9/6/2022  If you checked off any problems, how difficult have these problems made it for you to do your work, take care of things at home, or get along with other people?: Not difficult at all  PHQ9 TOTAL SCORE: 13  What is the reason for your visit today? : Rapid weight gain, short of breath, depressed because of the lack of being able to lose this belly fat, concerned about my thyroids.  How many servings of fruits and vegetables do you eat daily?: 0-1  On average, how many sweetened beverages do you drink each day (Examples: soda, juice, sweet tea, etc.  Do NOT count diet or artificially sweetened beverages)?: 0  How many minutes a day do you exercise enough to make your heart beat faster?: 9 or less  How many days a week do you exercise enough to make your heart beat faster?: 3 or less  How many days per week do you miss taking your medication?: 0    Reviewof Systems: A complete 14 point review of systems was obtained and is negative or as stated in the history of present illness.    Social  History     Socioeconomic History     Marital status: Single     Spouse name: Not on file     Number of children: Not on file     Years of education: Not on file     Highest education level: Not on file   Occupational History     Not on file   Tobacco Use     Smoking status: Never Smoker     Smokeless tobacco: Never Used   Substance and Sexual Activity     Alcohol use: No     Drug use: No     Sexual activity: Yes     Partners: Male     Comment: in relationship; in relationship   Other Topics Concern     Not on file   Social History Narrative     Not on file     Social Determinants of Health     Financial Resource Strain: Not on file   Food Insecurity: Not on file   Transportation Needs: Not on file   Physical Activity: Not on file   Stress: Not on file   Social Connections: Not on file   Intimate Partner Violence: Not on file   Housing Stability: Not on file       Active Ambulatory Problems     Diagnosis Date Noted     Thyroid nodule 09/15/2017     Vitamin D deficiency 01/04/2018     Elevated sed rate 01/04/2018     External hemorrhoid 02/01/2018     Prediabetes 04/26/2018     Resolved Ambulatory Problems     Diagnosis Date Noted     No Resolved Ambulatory Problems     No Additional Past Medical History       No family history on file.    Objective:     /60 (BP Location: Right arm, Patient Position: Sitting, Cuff Size: Adult Regular)   Pulse 69   Resp 20   Wt 88.5 kg (195 lb 3.2 oz)   SpO2 98%   BMI 34.58 kg/m      Patient is alert, in no obvious distress.   Skin: Warm, dry.    Neck: Supple, no lymphadenopathy.  Thyroid is enlarged bilaterally.   Lungs:  Clear to auscultation. Respirations even and unlabored.  No wheezing or rales noted.   Heart:  Regular rate and rhythm.  No murmurs, S3, S4, gallops, or rubs.    Abdomen: Soft, nontender.  No organomegaly. Bowel sounds normoactive. No guarding or masses noted.   Musculoskeletal: No edema is present in bilateral lower extremities.

## 2022-09-06 NOTE — LETTER
September 8, 2022      Meera Grier  [address] [place] [id number]        Dear ,    We are writing to inform you of your test results.    Your thyroid labs are normal.     Your creatinine (kidney function) is mildly elevated.  This can be due to dehydration.  It can also be the start of kidney disease.  I encourage you to stay well hydrated.  I also recommend you avoid taking Ibuprofen and Naproxen as these medications can make the kidneys work harder than they should.  Let's recheck this at a lab only appointment in one month.  Please come to the clinic well-hydrated.     Your liver enzyme (ALT or AST) is mildly elevated.  I encourage you to limit alcohol and tylenol use.  Let's recheck this the lab appointment as well.  If it remains elevated, we will evaluate this further.      Your A1C is normal.  You do not have prediabetes or diabetes.        Your BNP shows no signs of heart failure.          Resulted Orders   TSH   Result Value Ref Range    TSH 1.55 0.30 - 4.20 uIU/mL   T4, free   Result Value Ref Range    Free T4 1.02 0.90 - 1.70 ng/dL   Thyroid peroxidase antibody   Result Value Ref Range    Thyroid Peroxidase Antibody <10 <35 IU/mL   Cortisol   Result Value Ref Range    Cortisol 4.0   ug/dL      Comment:      6 months and older:  8 AM Cortisol Reference Range:  4-22 ug/dL   4 PM Cortisol Reference Range:  3-17 ug/dL    8 hrs post 1 mg dexamethasone given at midnight: < 5  g/dL   Comprehensive metabolic panel (BMP + Alb, Alk Phos, ALT, AST, Total. Bili, TP)   Result Value Ref Range    Sodium 143 136 - 145 mmol/L    Potassium 3.4 3.4 - 5.3 mmol/L    Creatinine 1.04 (H) 0.51 - 0.95 mg/dL    Urea Nitrogen 13.0 6.0 - 20.0 mg/dL    Chloride 106 98 - 107 mmol/L    Carbon Dioxide (CO2) 27 22 - 29 mmol/L    Anion Gap 10 7 - 15 mmol/L    Glucose 93 70 - 99 mg/dL    Calcium 9.4 8.6 - 10.0 mg/dL    Protein Total 7.5 6.4 - 8.3 g/dL    Albumin 4.1 3.5 - 5.2 g/dL    Bilirubin Total 0.2 <=1.2 mg/dL     Alkaline Phosphatase 94 35 - 104 U/L    AST 50 (H) 10 - 35 U/L    ALT 27 10 - 35 U/L    GFR Estimate 64 >60 mL/min/1.73m2      Comment:      Effective December 21, 2021 eGFRcr in adults is calculated using the 2021 CKD-EPI creatinine equation which includes age and gender (Janki et al., NEJM, DOI: 10.1056/ZMMYei0601909)   Hemoglobin A1c   Result Value Ref Range    Hemoglobin A1C 5.2 0.0 - 5.6 %      Comment:      Normal <5.7%   Prediabetes 5.7-6.4%    Diabetes 6.5% or higher     Note: Adopted from ADA consensus guidelines.   B-Type Natriuretic Peptide ([facility] Only)   Result Value Ref Range    BNP 34 0 - 80 pg/mL       If you have any questions or concerns, please call the clinic at the number listed above.       Sincerely,      COLBY Georges CNP

## 2022-09-07 LAB — THYROPEROXIDASE AB SERPL-ACNC: <10 IU/ML

## 2022-09-13 ENCOUNTER — TELEPHONE (OUTPATIENT)
Dept: FAMILY MEDICINE | Facility: CLINIC | Age: 57
End: 2022-09-13

## 2022-09-13 NOTE — TELEPHONE ENCOUNTER
Reason for call:  Other     Patient called regarding (reason for call): call back    Additional comments: Pt would like a call back to discuss lab results.    Phone number to reach patient:  Cell number on file:    Telephone Information:   Mobile [phone]       Best Time:  Any    Can we leave a detailed message on this number?  YES

## 2022-09-16 ENCOUNTER — HOSPITAL ENCOUNTER (OUTPATIENT)
Dept: ULTRASOUND IMAGING | Facility: CLINIC | Age: 57
Discharge: HOME | End: 2022-09-16
Attending: NURSE PRACTITIONER | Admitting: NURSE PRACTITIONER
Payer: COMMERCIAL

## 2022-09-16 DIAGNOSIS — E04.1 THYROID NODULE: ICD-10-CM

## 2022-09-16 PROCEDURE — 76536 US EXAM OF HEAD AND NECK: CPT

## 2022-09-19 ENCOUNTER — TELEPHONE (OUTPATIENT)
Dept: FAMILY MEDICINE | Facility: CLINIC | Age: 57
End: 2022-09-19

## 2022-09-19 NOTE — TELEPHONE ENCOUNTER
----- Message from COLBY Georges CNP sent at 9/18/2022 10:32 AM CDT -----  Please notify the patient that the ultrasound showed that her goiter is stable since her last imaging in 2017. None of the nodules appear concerning and do not require follow-up or biopsy.  Thanks.

## 2022-09-19 NOTE — TELEPHONE ENCOUNTER
Pt is calling back wanting to know about her lab results. Please review and contact patient to discuss further.

## 2022-09-19 NOTE — TELEPHONE ENCOUNTER
Pt was informed and states she never got the letter. Pt was informed and scheduled followup lab appointment. Please place orders for lab only appointment.

## 2022-09-19 NOTE — TELEPHONE ENCOUNTER
I called and left a message for her.  I sent her a letter in the mail.  I am not sure if she received it.  We just need to recheck her liver and kidney function in one month.  She may have been dehydrated at her visit.  Her other labs are normal.  If she has further questions, please let me know.  Thanks.

## 2022-09-21 DIAGNOSIS — R74.8 ELEVATED LIVER ENZYMES: ICD-10-CM

## 2022-09-21 DIAGNOSIS — N28.9 RENAL INSUFFICIENCY: Primary | ICD-10-CM

## 2022-10-12 ENCOUNTER — NURSE TRIAGE (OUTPATIENT)
Dept: NURSING | Facility: CLINIC | Age: 57
End: 2022-10-12

## 2022-10-12 DIAGNOSIS — E04.1 THYROID NODULE: Primary | ICD-10-CM

## 2022-10-12 NOTE — TELEPHONE ENCOUNTER
Clinic Action Needed:Yes    Reason for Call:  Patient is requesting to add labs onto upcoming appointment on 10/14/22.  Requesting lab orders for T3 and T4.    Juanita Orellana RN  [facility]    Reason for Disposition    [1] Caller requests to speak ONLY to PCP AND [2] NON-URGENT question    Additional Information    Negative: Lab calling with strep throat test results and triager can call in prescription    Negative: Lab calling with urinalysis test results and triager can call in prescription    Negative: Medication questions    Negative: Medication renewal and refill questions    Negative: Pre-operative or pre-procedural questions    Negative: Caller requesting lab results  (Exception: Routine or non-urgent lab result.)    Negative: ED call to PCP (i.e., primary care provider; doctor, NP, or PA)    Negative: Doctor (or NP/PA) call to PCP    Negative: Call about patient who is currently hospitalized    Negative: Lab or radiology calling with CRITICAL test results    Negative: [1] Follow-up call from patient regarding patient's clinical status AND [2] information urgent    Negative: [1] Caller requests to speak ONLY to PCP AND [2] URGENT question    Negative: [1] Caller requests to speak to PCP now AND [2] won't tell us reason for call  (Exception: If 10 pm to 6 am, caller must first discuss reason for the call.)    Negative: Notification of hospital admission    Negative: Notification of death    Negative: Lab or radiology calling with test results    Negative: [1] Follow-up call from patient regarding patient's clinical status AND [2] information NON-URGENT    Protocols used: [facility]CP CALL - [facility]

## 2022-10-12 NOTE — TELEPHONE ENCOUNTER
Both the T4 free and TSH were normal in September.  I added on the T3 total for her future lab appointment.  Thanks.

## 2022-10-13 ENCOUNTER — TELEPHONE (OUTPATIENT)
Dept: FAMILY MEDICINE | Facility: CLINIC | Age: 57
End: 2022-10-13

## 2022-10-14 ENCOUNTER — LAB (OUTPATIENT)
Dept: LAB | Facility: CLINIC | Age: 57
End: 2022-10-14
Payer: COMMERCIAL

## 2022-10-14 DIAGNOSIS — N28.9 RENAL INSUFFICIENCY: ICD-10-CM

## 2022-10-14 DIAGNOSIS — R74.8 ELEVATED LIVER ENZYMES: ICD-10-CM

## 2022-10-14 DIAGNOSIS — E04.1 THYROID NODULE: ICD-10-CM

## 2022-10-14 LAB
ALBUMIN SERPL BCG-MCNC: 4.1 G/DL (ref 3.5–5.2)
ALP SERPL-CCNC: 96 U/L (ref 35–104)
ALT SERPL W P-5'-P-CCNC: 14 U/L (ref 10–35)
ANION GAP SERPL CALCULATED.3IONS-SCNC: 11 MMOL/L (ref 7–15)
AST SERPL W P-5'-P-CCNC: 23 U/L (ref 10–35)
BILIRUB SERPL-MCNC: 0.3 MG/DL
BUN SERPL-MCNC: 16.2 MG/DL (ref 6–20)
CALCIUM SERPL-MCNC: 9.4 MG/DL (ref 8.6–10)
CHLORIDE SERPL-SCNC: 104 MMOL/L (ref 98–107)
CREAT SERPL-MCNC: 0.76 MG/DL (ref 0.51–0.95)
DEPRECATED HCO3 PLAS-SCNC: 26 MMOL/L (ref 22–29)
GFR SERPL CREATININE-BSD FRML MDRD: >90 ML/MIN/1.73M2
GLUCOSE SERPL-MCNC: 90 MG/DL (ref 70–99)
POTASSIUM SERPL-SCNC: 3.9 MMOL/L (ref 3.4–5.3)
PROT SERPL-MCNC: 7.4 G/DL (ref 6.4–8.3)
SODIUM SERPL-SCNC: 141 MMOL/L (ref 136–145)
T3 SERPL-MCNC: 109 NG/DL (ref 85–202)

## 2022-10-14 PROCEDURE — 80053 COMPREHEN METABOLIC PANEL: CPT

## 2022-10-14 PROCEDURE — 84480 ASSAY TRIIODOTHYRONINE (T3): CPT

## 2022-10-14 PROCEDURE — 36415 COLL VENOUS BLD VENIPUNCTURE: CPT

## 2022-10-14 NOTE — TELEPHONE ENCOUNTER
Patient Returning Call    Reason for call:  Returning call to clinic    Information relayed to patient:  Relayed Allison Carranza message.     Patient has additional questions:  No

## 2022-11-14 ENCOUNTER — TELEPHONE (OUTPATIENT)
Dept: NURSING | Facility: CLINIC | Age: 57
End: 2022-11-14

## 2022-11-14 ENCOUNTER — TELEPHONE (OUTPATIENT)
Dept: FAMILY MEDICINE | Facility: CLINIC | Age: 57
End: 2022-11-14

## 2022-11-14 NOTE — TELEPHONE ENCOUNTER
Patient states she is returning a call from the clinic. Transferred caller to Samantha at [facility] to follow-up.    Emma Valencia RN  11/14/22 9:34 AM  [facility] Nurse Advisor

## 2023-02-04 ENCOUNTER — HEALTH MAINTENANCE LETTER (OUTPATIENT)
Age: 58
End: 2023-02-04

## 2023-02-05 NOTE — PROGRESS NOTES
"    New Medical Weight Management Consult    PATIENT:  Meera Grier  MRN:         [id number]  :         1965  RAHDA:         2023    Dear Allison Carranza CNP,    I had the pleasure of seeing your patient, Meera Grier. Full intake/assessment was done to determine barriers to weight loss success and develop a treatment plan. Meera Grier is a 57 year old female interested in treatment of medical problems associated with excess weight. She has a height of 5' 3\", a weight of 189 lbs 0 oz, and the calculated Body mass index is 33.48 kg/m .    ASSESSMENT/PLAN:  1. Class 2 severe obesity due to excess calories with serious comorbidity and body mass index (BMI) of 35.0 to 35.9 in adult (H)  We discussed healthy habits to assist with weight loss. She will work on planning meals ahead of time using the plate method for portion control and macronutrient proportions. She will continue her exercise. We discussed medication that may assist with weight loss. Metformin was prescribed. Risks/ benefits and possible side effects were discussed and questions were answered. Written information was given. She would also be a candidate for phetermine      2. Prediabetes  We discussed insulin resistance and how it can interfere with weight loss efforts. We discussed dietary changes and exercise to reduce insulin resistance. We discussed the use of metformin. Patient had her questions answered.     She has the following co-morbidities:       2/3/2023   I have the following health issues associated with obesity: None of the above   I have the following symptoms associated with obesity: Depression       Patient Goals 2/3/2023   I am interested in having a healthier weight to diminish current health problems: Yes   I am interested in having a healthier weight in order to prevent future health problems: Yes   I am interested in having a healthier weight in order to have a future surgery: No       Referring Provider " "2/3/2023   Please name the provider who referred you to Medical Weight Management.  If you do not know, please answer: \"I Don't Know\". Dr. Allison Carranza       Weight History 2/3/2023   How concerned are you about your weight? Very Concerned   Would you describe your weight gain as gradual? Yes   I became overweight: As an Adult   The following factors have contributed to my weight gain:  Other   Please list the other factors.  Menopause   I have tried the following methods to lose weight: Exercise   My lowest weight since age 20 was: 125   My highest weight since age 20 was: 162   The most weight I have ever lost was: (lbs) 10   I have the following family history of obesity/being overweight:  One or more of my siblings are overweight   Has anyone in your family had weight loss surgery? No   How has your weight changed over the last year?  Gained   How many pounds? 35       Diet Recall Review with Patient 2/3/2023   Do you typically eat breakfast? Yes   If you do eat breakfast, what types of food do you eat? Grits and fruit   Do you typically eat lunch? Yes   If you do eat lunch, what types of food do you typically eat?  Roasted broccoli, cauliflower,  salads, fruits   Do you typically eat supper? No   Do you typically eat snacks? Yes- after second job   If you do snack, what types of food do you typically eat? Dried craisins, popcorn   Do you like vegetables?  Yes, 1 serving per day   Do you drink water? Yes- not quite 64 oz   How many glasses of juice do you drink in a typical day? 0   How many of glasses of milk do you drink in a typical day? 0   If you do drink milk, what type? 2%   How many 8oz glasses of sugar containing drinks such as Keith-Aid/sweet tea do you drink in a day? 0   How many cans/bottles of sugar pop/soda/tea/sports drinks do you drink in a day? 0   How many cans/bottles of diet pop/soda/tea or sports drink do you drink in a day? 0   How often do you have a drink of alcohol? Never       Eating " Habits 2/3/2023   Generally, my meals include foods like these: bread, pasta, rice, potatoes, corn, crackers, sweet dessert, pop, or juice. Never   Generally, my meals include foods like these: fried meats, brats, burgers, french fries, pizza, cheese, chips, or ice cream. Never   Eat fast food (like [employer], [employer], [employer]). Never   Eat at a buffet or sit-down restaurant. Never   Eat most of my meals in front of the TV or computer. Never   Often skip meals, eat at random times, have no regular eating times. Almost Everyday   Rarely sit down for a meal but snack or graze throughout.  A Few Times a Week   Eat extra snacks between meals. Never   Eat most of my food at the end of the day. Never   Eat in the middle of the night or wake up at night to eat. Never   Eat extra snacks to prevent or correct low blood sugar. Never   Eat to prevent acid reflux or stomach pain. Never   Worry about not having enough food to eat. Never   Have you been to the food shelf at least a few times this year? No   I eat when I am depressed. Never   I eat when I am stressed. Never   I eat when I am bored. Never   I eat when I am anxious. Never   I eat when I am happy or as a reward. Never   I feel hungry all the time even if I just have eaten. Never   Feeling full is important to me. Never   I finish all the food on my plate even if I am already full. Once a Week   I can't resist eating delicious food or walk past the good food/smell. Never   I eat/snack without noticing that I am eating. Never   I eat when I am preparing the meal. Once a Week   I eat more than usual when I see others eating. Never   I have trouble not eating sweets, ice cream, cookies, or chips if they are around the house. Never   I think about food all day. Never     Meals not prepared at home per week: 2-3    Amount of Food 2/3/2023   I make myself vomit what I have eaten or use laxatives to get rid of food. Never   I eat a large amount of food, like a loaf of  bread, a box of cookies, a pint/quart of ice cream, all at once. Never   I eat a large amount of food even when I am not hungry. Never   I eat rapidly. Never   I eat alone because I feel embarrassed and do not want others to see how much I have eaten. Never   I eat until I am uncomfortably full. Never   I feel bad, disgusted, or guilty after I overeat. Never   I make myself vomit what I have eaten or use laxatives to get rid of food. Never       Activity/Exercise History 2/3/2023   How much of a typical 12 hour day do you spend sitting? Most of the Day   How much of a typical 12 hour day do you spend lying down? Less Than Half the Day   How much of a typical day do you spend walking/standing? Less Than Half the Day   How many hours (not including work) do you spend on the TV/Video Games/Computer/Tablet/Phone? 2-3 Hours   How many times a week are you active for the purpose of exercise? 2-3 Times a Week, started going back to the gym 30 minutes, cardio and weights 5 days per week   What keeps you from being more active? Too tired   How many total minutes do you spend doing some activity for the purpose of exercising when you exercise? More Than 30 Minutes       PAST MEDICAL HISTORY:  No past medical history on file.    Work/Social History Reviewed With Patient 2/3/2023   My employment status is: Full-Time   My job is: Sitting at a desk   How much of your job is spent on the computer or phone? 100%   How many hours do you spend commuting to work daily?  2.5   What is your marital status? Single   If in a relationship, is your significant other overweight? No   Do you have children? Yes   If you have children, are they overweight? No   Who do you live with?  Self   Are they supportive of your health goals? Yes   Who does the food shopping?  Me       Mental Health History Reviewed With Patient 2/3/2023   Have you ever been physically or sexually abused? No   How often in the past 2 weeks have you felt little interest or  pleasure in doing things? Not at all   Over the past 2 weeks how often have you felt down, depressed, or hopeless? Not at all       Sleep History Reviewed With Patient 2/3/2023   How many hours do you sleep at night? 4- can't get herself to bed early   Do you think that you snore loudly or has anybody ever heard you snore loudly (louder than talking or so loud it can be heard behind a shut door)? No   Has anyone seen or heard you stop breathing during your sleep? No   Do you often feel tired, fatigued, or sleepy during the day? Yes   Do you have a TV/Computer in your bedroom? No       MEDICATIONS:   Current Outpatient Medications   Medication Sig Dispense Refill     metFORMIN (GLUCOPHAGE XR) 500 MG 24 hr tablet 1 tablet with dinner daily for 2 weeks then 2 tablets with dinner 90 tablet 1       ALLERGIES:   No Known Allergies     ROS  General  Fatigue: sometimes  HEENT  Hx of glaucoma: no  Vision changes: no  Cardiovascular  Hx of heart disease: no  Chest Pain with Exertion: no  Palpitations: no  Pulmonary  Shortness of breath at rest: no  Shortness of breath with exertion: no  Stop-bang score: 2  [place] Score: 12  Gastrointestinal  Heartburn: no  Psychiatric  Moods Stable: yes  Endocrine  Polydipsia: no  No personal or family history of medullary thyroid cancer: no  Neurologic  Hx of seizures: no  Migraine headaches: history of     Birth control: tubes tied  Kidney stones: no    PHYSICAL EXAM:    GEN: Alert and oriented in no acute distress.   HEENT: mucous membranes moist  NECK: supple with LAD or thyromegaly  LUNGS: CTA without wheezes or crackles, good air movement throughout  CV: RRR no MRG  ABDOMEN: moderate protuberance  SKIN: no rashes, no skin tags, mild acanthosis nigricans      FOLLOW-UP:   12 weeks.    Total time spent on the date of this encounter doing: chart review, review of test results, patient visit, physical exam, education, counseling, developing plan of care and documenting = 45  minutes.  Sincerely,    REBECCA Richardson MD

## 2023-02-07 ENCOUNTER — OFFICE VISIT (OUTPATIENT)
Dept: SURGERY | Facility: CLINIC | Age: 58
End: 2023-02-07
Attending: NURSE PRACTITIONER
Payer: COMMERCIAL

## 2023-02-07 ENCOUNTER — VIRTUAL VISIT (OUTPATIENT)
Dept: SURGERY | Facility: CLINIC | Age: 58
End: 2023-02-07
Payer: COMMERCIAL

## 2023-02-07 VITALS
DIASTOLIC BLOOD PRESSURE: 76 MMHG | HEIGHT: 63 IN | WEIGHT: 189 LBS | BODY MASS INDEX: 33.49 KG/M2 | SYSTOLIC BLOOD PRESSURE: 128 MMHG

## 2023-02-07 DIAGNOSIS — E66.811 CLASS 1 OBESITY DUE TO EXCESS CALORIES WITHOUT SERIOUS COMORBIDITY WITH BODY MASS INDEX (BMI) OF 34.0 TO 34.9 IN ADULT: ICD-10-CM

## 2023-02-07 DIAGNOSIS — R73.03 PREDIABETES: ICD-10-CM

## 2023-02-07 DIAGNOSIS — E66.812 CLASS 2 SEVERE OBESITY DUE TO EXCESS CALORIES WITH SERIOUS COMORBIDITY AND BODY MASS INDEX (BMI) OF 35.0 TO 35.9 IN ADULT (H): Primary | ICD-10-CM

## 2023-02-07 DIAGNOSIS — E66.09 CLASS 1 OBESITY DUE TO EXCESS CALORIES WITHOUT SERIOUS COMORBIDITY WITH BODY MASS INDEX (BMI) OF 34.0 TO 34.9 IN ADULT: ICD-10-CM

## 2023-02-07 DIAGNOSIS — E66.9 OBESITY (BMI 30-39.9): Primary | ICD-10-CM

## 2023-02-07 DIAGNOSIS — E66.01 CLASS 2 SEVERE OBESITY DUE TO EXCESS CALORIES WITH SERIOUS COMORBIDITY AND BODY MASS INDEX (BMI) OF 35.0 TO 35.9 IN ADULT (H): Primary | ICD-10-CM

## 2023-02-07 PROCEDURE — 99204 OFFICE O/P NEW MOD 45 MIN: CPT | Performed by: FAMILY MEDICINE

## 2023-02-07 PROCEDURE — 97802 MEDICAL NUTRITION INDIV IN: CPT | Mod: 95

## 2023-02-07 RX ORDER — METFORMIN HCL 500 MG
TABLET, EXTENDED RELEASE 24 HR ORAL
Qty: 90 TABLET | Refills: 1 | Status: SHIPPED | OUTPATIENT
Start: 2023-02-07 | End: 2023-07-03

## 2023-02-07 NOTE — PATIENT INSTRUCTIONS

## 2023-02-07 NOTE — Clinical Note
"    2023         RE: Meera Grier  [address] [address]        Dear Colleague,    Thank you for referring your patient, Meera Grier, to the [facility] [place]. Please see a copy of my visit note below.        New Medical Weight Management Consult    PATIENT:  Meera Grier  MRN:         [id number]  :         1965  RADHA:         2023    Dear Allison Carranza CNP,    I had the pleasure of seeing your patient, Meera Grier. Full intake/assessment was done to determine barriers to weight loss success and develop a treatment plan. Meera Grier is a 57 year old female interested in treatment of medical problems associated with excess weight. She has a height of 5' 3\", a weight of 189 lbs 0 oz, and the calculated Body mass index is 33.48 kg/m .    ASSESSMENT/PLAN:  1. Class 2 severe obesity due to excess calories with serious comorbidity and body mass index (BMI) of 35.0 to 35.9 in adult (H)  We discussed healthy habits to assist with weight loss. She will work on planning meals ahead of time using the plate method for portion control and macronutrient proportions. She will continue her exercise. We discussed medication that may assist with weight loss. Metformin was prescribed. Risks/ benefits and possible side effects were discussed and questions were answered. Written information was given. She would also be a candidate for phetermine      2. Prediabetes  We discussed insulin resistance and how it can interfere with weight loss efforts. We discussed dietary changes and exercise to reduce insulin resistance. We discussed the use of metformin. Patient had her questions answered.     She has the following co-morbidities:       2/3/2023   I have the following health issues associated with obesity: None of the above   I have the following symptoms associated with obesity: Depression       Patient Goals 2/3/2023   I am " "interested in having a healthier weight to diminish current health problems: Yes   I am interested in having a healthier weight in order to prevent future health problems: Yes   I am interested in having a healthier weight in order to have a future surgery: No       Referring Provider 2/3/2023   Please name the provider who referred you to Medical Weight Management.  If you do not know, please answer: \"I Don't Know\". Dr. Allison Carranza       Weight History 2/3/2023   How concerned are you about your weight? Very Concerned   Would you describe your weight gain as gradual? Yes   I became overweight: As an Adult   The following factors have contributed to my weight gain:  Other   Please list the other factors.  Menopause   I have tried the following methods to lose weight: Exercise   My lowest weight since age 20 was: 125   My highest weight since age 20 was: 162   The most weight I have ever lost was: (lbs) 10   I have the following family history of obesity/being overweight:  One or more of my siblings are overweight   Has anyone in your family had weight loss surgery? No   How has your weight changed over the last year?  Gained   How many pounds? 35       Diet Recall Review with Patient 2/3/2023   Do you typically eat breakfast? Yes   If you do eat breakfast, what types of food do you eat? Grits and fruit   Do you typically eat lunch? Yes   If you do eat lunch, what types of food do you typically eat?  Roasted broccoli, cauliflower,  salads, fruits   Do you typically eat supper? No   Do you typically eat snacks? Yes- after second job   If you do snack, what types of food do you typically eat? Dried craisins, popcorn   Do you like vegetables?  Yes, 1 serving per day   Do you drink water? Yes- not quite 64 oz   How many glasses of juice do you drink in a typical day? 0   How many of glasses of milk do you drink in a typical day? 0   If you do drink milk, what type? 2%   How many 8oz glasses of sugar containing drinks " such as Keith-Aid/sweet tea do you drink in a day? 0   How many cans/bottles of sugar pop/soda/tea/sports drinks do you drink in a day? 0   How many cans/bottles of diet pop/soda/tea or sports drink do you drink in a day? 0   How often do you have a drink of alcohol? Never       Eating Habits 2/3/2023   Generally, my meals include foods like these: bread, pasta, rice, potatoes, corn, crackers, sweet dessert, pop, or juice. Never   Generally, my meals include foods like these: fried meats, brats, burgers, french fries, pizza, cheese, chips, or ice cream. Never   Eat fast food (like [employer], [employer], [employer]aco Bell). Never   Eat at a buffet or sit-down restaurant. Never   Eat most of my meals in front of the TV or computer. Never   Often skip meals, eat at random times, have no regular eating times. Almost Everyday   Rarely sit down for a meal but snack or graze throughout.  A Few Times a Week   Eat extra snacks between meals. Never   Eat most of my food at the end of the day. Never   Eat in the middle of the night or wake up at night to eat. Never   Eat extra snacks to prevent or correct low blood sugar. Never   Eat to prevent acid reflux or stomach pain. Never   Worry about not having enough food to eat. Never   Have you been to the food shelf at least a few times this year? No   I eat when I am depressed. Never   I eat when I am stressed. Never   I eat when I am bored. Never   I eat when I am anxious. Never   I eat when I am happy or as a reward. Never   I feel hungry all the time even if I just have eaten. Never   Feeling full is important to me. Never   I finish all the food on my plate even if I am already full. Once a Week   I can't resist eating delicious food or walk past the good food/smell. Never   I eat/snack without noticing that I am eating. Never   I eat when I am preparing the meal. Once a Week   I eat more than usual when I see others eating. Never   I have trouble not eating sweets, ice cream,  cookies, or chips if they are around the house. Never   I think about food all day. Never     Meals not prepared at home per week: 2-3    Amount of Food 2/3/2023   I make myself vomit what I have eaten or use laxatives to get rid of food. Never   I eat a large amount of food, like a loaf of bread, a box of cookies, a pint/quart of ice cream, all at once. Never   I eat a large amount of food even when I am not hungry. Never   I eat rapidly. Never   I eat alone because I feel embarrassed and do not want others to see how much I have eaten. Never   I eat until I am uncomfortably full. Never   I feel bad, disgusted, or guilty after I overeat. Never   I make myself vomit what I have eaten or use laxatives to get rid of food. Never       Activity/Exercise History 2/3/2023   How much of a typical 12 hour day do you spend sitting? Most of the Day   How much of a typical 12 hour day do you spend lying down? Less Than Half the Day   How much of a typical day do you spend walking/standing? Less Than Half the Day   How many hours (not including work) do you spend on the TV/Video Games/Computer/Tablet/Phone? 2-3 Hours   How many times a week are you active for the purpose of exercise? 2-3 Times a Week, started going back to the gym 30 minutes, cardio and weights 5 days per week   What keeps you from being more active? Too tired   How many total minutes do you spend doing some activity for the purpose of exercising when you exercise? More Than 30 Minutes       PAST MEDICAL HISTORY:  No past medical history on file.    Work/Social History Reviewed With Patient 2/3/2023   My employment status is: Full-Time   My job is: Sitting at a desk   How much of your job is spent on the computer or phone? 100%   How many hours do you spend commuting to work daily?  2.5   What is your marital status? Single   If in a relationship, is your significant other overweight? No   Do you have children? Yes   If you have children, are they overweight?  No   Who do you live with?  Self   Are they supportive of your health goals? Yes   Who does the food shopping?  Me       Mental Health History Reviewed With Patient 2/3/2023   Have you ever been physically or sexually abused? No   How often in the past 2 weeks have you felt little interest or pleasure in doing things? Not at all   Over the past 2 weeks how often have you felt down, depressed, or hopeless? Not at all       Sleep History Reviewed With Patient 2/3/2023   How many hours do you sleep at night? 4- can't get herself to bed early   Do you think that you snore loudly or has anybody ever heard you snore loudly (louder than talking or so loud it can be heard behind a shut door)? No   Has anyone seen or heard you stop breathing during your sleep? No   Do you often feel tired, fatigued, or sleepy during the day? Yes   Do you have a TV/Computer in your bedroom? No       MEDICATIONS:   Current Outpatient Medications   Medication Sig Dispense Refill     metFORMIN (GLUCOPHAGE XR) 500 MG 24 hr tablet 1 tablet with dinner daily for 2 weeks then 2 tablets with dinner 90 tablet 1       ALLERGIES:   No Known Allergies     ROS  General  Fatigue: sometimes  HEENT  Hx of glaucoma: no  Vision changes: no  Cardiovascular  Hx of heart disease: no  Chest Pain with Exertion: no  Palpitations: no  Pulmonary  Shortness of breath at rest: no  Shortness of breath with exertion: no  Stop-bang score: 2  [place] Score: 12  Gastrointestinal  Heartburn: no  Psychiatric  Moods Stable: yes  Endocrine  Polydipsia: no  No personal or family history of medullary thyroid cancer: no  Neurologic  Hx of seizures: no  Migraine headaches: history of     Birth control: tubes tied  Kidney stones: no    PHYSICAL EXAM:    GEN: Alert and oriented in no acute distress.   HEENT: mucous membranes moist  NECK: supple with LAD or thyromegaly  LUNGS: CTA without wheezes or crackles, good air movement throughout  CV: RRR no MRG  ABDOMEN: moderate  protuberance  SKIN: no rashes, no skin tags, mild acanthosis nigricans      FOLLOW-UP:   12 weeks.    Total time spent on the date of this encounter doing: chart review, review of test results, patient visit, physical exam, education, counseling, developing plan of care and documenting = *** minutes.  Sincerely,    REBECCA Richardson MD          Again, thank you for allowing me to participate in the care of your patient.        Sincerely,        REBECCA Richardson MD

## 2023-02-07 NOTE — LETTER
"    2/7/2023         RE: Meera Grier  [address] [facility] [address]        Dear Colleague,    Thank you for referring your patient, Meera Grier, to the [facility] [place]. Please see a copy of my visit note below.    Meera Grier is a 57 year old who is being evaluated via a billable video visit.        How would you like to obtain your AVS? MyChart  If the video visit is dropped, the invitation should be resent by: Send to e-mail at: [email or web address]  Will anyone else be joining your video visit? No          Medical Weight Loss Initial Diet Evaluation  Assessment:  Meera is presenting today for a new weight management nutrition consultation. Pt has had an initial appointment with Dr. Richardson.  Weight loss medication: Metformin.      Personal Goals: Wants to have a better dietary lifestyle and not feeling like she is \"dieting\". Wants to learn healthy eating habits.    Anthropometrics:    Initial weight: 189 lbs    BMI: There is no height or weight on file to calculate BMI.   Ideal body weight: 52.4 kg (115 lb 8.3 oz)  Adjusted ideal body weight: 65.7 kg (144 lb 14.6 oz)  Estimated RMR ([place]-St Jeor equation):    1420 kcals x 1.3 (light active) = 1950 kcals (for weight maintenance)    Recommended Protein Intake: 60-80 grams of protein/day    Medical History:  Patient Active Problem List   Diagnosis     Thyroid nodule     Vitamin D deficiency     Elevated sed rate     External hemorrhoid     Prediabetes      Diabetes: no- prediabetes   HbA1c:  No results found for: HGBA1C    Nutrition History:   Food allergies/intolerances/cultural or religous food customs: No     Weight loss history: going through menopause, has gained weight over the last few years. Has hx of weight maintenance via diet habits and exercise but weight gain has become excessive per patient.     Vitamins/Mineral Supplementation: Iron, Vit D, Applecider vinegar for " bloating per patient report.     Dietary Recall:  Breakfast 6:30-7:30am: Grits and fruit, eggs, tater tots, tony  Lunch 1:30-2:30pm: Roasted broccoli, cauliflower,  salads, fruits, chicken  Dinner: skips may  Typical Snacks: Dried craisins, popcorn, nuts   Overnight eating: No  Eating out: 1x per week     Beverages: Water, coffee,  occasionally  Cranberry juice     Exercise: Goes to the gym to lift weights, cardio, 30 min every day.     Nutrition Diagnosis (PES statement):   Overweight/Obesity (NC 3.3) related to overeating and poor lifestyle habits as evidenced by patient report of large portions, low protein intake and BMI 33.4    Disordered Eating Pattern (NB 1.5) related to obsessive desire, intake of food exceeding RMR as evidenced by large portions per patient, frequent grazing, skipping meals     Nutrition Intervention  1. Food and/or Nutrient Delivery   a. Placed emphasis on importance of developing a healthy meal routine, aiming for 3 meals a day and no snacks.  b. Discussed using a protein supplement as a meal replacement.  2. Nutrition Education   a. Discussed with patient how to build a meal: the importance of including a lean/low fat protein at each meal, include a source of vegetables at a minimum of lunch and dinner and limiting carbohydrate intake  b. Educated on sources of lean protein, portion sizes, the amount of grams found in each source.   c. Educated on how to read a food label: keeping total fat <10g and sugar <10g per serving.  3. Nutrition Counseling   a. Encouraged importance of developing routine exercise for health benefits and weight loss.  b. Discussed mindful eating techniques such as eating off smaller places/bowls      Goals established by patient:   1. Incorporate more protein in her diet.   2. Start portioning out the evening snacks vs grazing   3. practice portion control by eating off salad size plate or portion bowls       Handouts provided:  My plate  Protein sources  Carb  sources    Assessment/Plan:    Pt will follow up in 3 month(s) with bariatrician and 3 month(s) with dietitian.         Video-Visit Details    Type of service:  Video Visit    Video Start Time (time video started): 2:28 pm    Video End Time (time video stopped): 3:08 pm    Originating Location (pt. Location): Home        Distant Location (provider location):  Off-site    Mode of Communication:  Video Conference via [facility]    Physician has received verbal consent for a Video Visit from the patient? Yes      Lauren Pate RD          Again, thank you for allowing me to participate in the care of your patient.        Sincerely,        Lauren Pate RD

## 2023-02-07 NOTE — PROGRESS NOTES
"Meera Grier is a 57 year old who is being evaluated via a billable video visit.        How would you like to obtain your AVS? MyChart  If the video visit is dropped, the invitation should be resent by: Send to e-mail at: [email or web address]  Will anyone else be joining your video visit? No          Medical Weight Loss Initial Diet Evaluation  Assessment:  Meera is presenting today for a new weight management nutrition consultation. Pt has had an initial appointment with Dr. Richardson.  Weight loss medication: Metformin.      Personal Goals: Wants to have a better dietary lifestyle and not feeling like she is \"dieting\". Wants to learn healthy eating habits.    Anthropometrics:    Initial weight: 189 lbs    BMI: There is no height or weight on file to calculate BMI.   Ideal body weight: 52.4 kg (115 lb 8.3 oz)  Adjusted ideal body weight: 65.7 kg (144 lb 14.6 oz)  Estimated RMR ([place]-St Jeor equation):    1420 kcals x 1.3 (light active) = 1950 kcals (for weight maintenance)    Recommended Protein Intake: 60-80 grams of protein/day    Medical History:  Patient Active Problem List   Diagnosis     Thyroid nodule     Vitamin D deficiency     Elevated sed rate     External hemorrhoid     Prediabetes      Diabetes: no- prediabetes   HbA1c:  No results found for: HGBA1C    Nutrition History:   Food allergies/intolerances/cultural or religous food customs: No     Weight loss history: going through menopause, has gained weight over the last few years. Has hx of weight maintenance via diet habits and exercise but weight gain has become excessive per patient.     Vitamins/Mineral Supplementation: Iron, Vit D, Applecider vinegar for bloating per patient report.     Dietary Recall:  Breakfast 6:30-7:30am: Grits and fruit, eggs, tater tots, tony  Lunch 1:30-2:30pm: Roasted broccoli, cauliflower,  salads, fruits, chicken  Dinner: skips may  Typical Snacks: Dried craisins, popcorn, nuts   Overnight eating: No  Eating " out: 1x per week     Beverages: Water, coffee,  occasionally  Cranberry juice     Exercise: Goes to the gym to lift weights, cardio, 30 min every day.     Nutrition Diagnosis (PES statement):   Overweight/Obesity (NC 3.3) related to overeating and poor lifestyle habits as evidenced by patient report of large portions, low protein intake and BMI 33.4    Disordered Eating Pattern (NB 1.5) related to obsessive desire, intake of food exceeding RMR as evidenced by large portions per patient, frequent grazing, skipping meals     Nutrition Intervention  1. Food and/or Nutrient Delivery   a. Placed emphasis on importance of developing a healthy meal routine, aiming for 3 meals a day and no snacks.  b. Discussed using a protein supplement as a meal replacement.  2. Nutrition Education   a. Discussed with patient how to build a meal: the importance of including a lean/low fat protein at each meal, include a source of vegetables at a minimum of lunch and dinner and limiting carbohydrate intake  b. Educated on sources of lean protein, portion sizes, the amount of grams found in each source.   c. Educated on how to read a food label: keeping total fat <10g and sugar <10g per serving.  3. Nutrition Counseling   a. Encouraged importance of developing routine exercise for health benefits and weight loss.  b. Discussed mindful eating techniques such as eating off smaller places/bowls      Goals established by patient:   1. Incorporate more protein in her diet.   2. Start portioning out the evening snacks vs grazing   3. practice portion control by eating off salad size plate or portion bowls       Handouts provided:  My plate  Protein sources  Carb sources    Assessment/Plan:    Pt will follow up in 3 month(s) with bariatrician and 3 month(s) with dietitian.         Video-Visit Details    Type of service:  Video Visit    Video Start Time (time video started): 2:28 pm    Video End Time (time video stopped): 3:08 pm    Originating  Location (pt. Location): Home        Distant Location (provider location):  Off-site    Mode of Communication:  Video Conference via [facility]    Physician has received verbal consent for a Video Visit from the patient? Yes      Lauren Pate RD

## 2023-05-23 ENCOUNTER — VIRTUAL VISIT (OUTPATIENT)
Dept: SURGERY | Facility: CLINIC | Age: 58
End: 2023-05-23
Payer: COMMERCIAL

## 2023-05-23 DIAGNOSIS — E66.9 OBESITY (BMI 30-39.9): Primary | ICD-10-CM

## 2023-05-23 DIAGNOSIS — Z71.3 NUTRITIONAL COUNSELING: ICD-10-CM

## 2023-05-23 PROCEDURE — 97803 MED NUTRITION INDIV SUBSEQ: CPT | Mod: VID

## 2023-05-23 NOTE — PROGRESS NOTES
Meera Grier is a 57 year old who is being evaluated via a billable video visit.      How would you like to obtain your AVS? MyChart  If the video visit is dropped, the invitation should be resent by: Send to e-mail at: [email or web address]  Will anyone else be joining your video visit? No :[id number]}      Medical  Weight Loss Follow-Up Diet Evaluation  Assessment:  Meera is presenting today for a follow up weight management nutrition consultation.  This patient has had an initial appointment and was referred by Dr. Richardson for MNT as treatment for Obesity which is impacting her depression.   Weight loss medication: Metformin.   Pt's weight is unknown as she doesn't have a scale at home.  Initial weight: 189 lbs  Weight change: n/a         View : No data to display.              BMI: 33.48 kg/m2      Estimated RMR ([place]-St Jeor equation):  1420 kcals x 1.3 (light active) = 1950 kcals (for weight maintenance)  Recommended Protein Intake: 60-80 grams of protein/day  Patient Active Problem List:  Patient Active Problem List   Diagnosis     Thyroid nodule     Vitamin D deficiency     Elevated sed rate     External hemorrhoid     Prediabetes     Progress on goals from last visit: Patient reports things are going well. She has made great progress. Noted her portion sizes are smaller and hydration via water increased significantly. Is seeing non scale related positive changes (clothing fitting looser, stomach shrinking). No longer grazing in the evening time, stops eating after 7pm. States she feels great and she does not have any stomach pains or issues. Energy levels are high. States the biggest change that has the most helpful is controlling her portion sizes.     +great mindful choices   +eating pleasure foods in moderation.   +eating half of meals that may be larger and eating the other half at other meal.    1. Incorporate more protein in her diet. - met   2. Start portioning out the evening snacks vs  grazing - met   3. practice portion control by eating off salad size plate or portion bowls - met     Dietary Recall:  Breakfast: egg, tony and fruit or 1/2 omelet   Lunch: other 1/2 omelet   Dinner: roasted garlic chicken breast and cauliflower   Typical snacks: portion controled popcorn, fruit   Eating out: 1x per week   Beverages: Water, coffee   Exercise: Goes to the gym to lift weights, cardio, 30 min every day.  Nutrition Diagnosis:    Obesity related to overeating and poor lifestyle habits as evidenced by patient report of large portions, low protein intake and BMI 33.4 kg/m2      Intervention:  1. Food and/or nutrient delivery:   a. discussed benefits of portion control when snacking and/or eating meals.   2. Nutrition education:  a. Educated patient that with diet and exercise her weight may not drastically change as she is lifting weights but the non scale victories and just as rewarding.  3. Nutrition counseling:   a. congratulated patient on success with nutritionally changes and habits. Discussed non scale victories to recognize.       Monitoring/Evaluation:    Goals:  1. continue with current goals in place.       Patient to follow up in 6 month(s) with RD    Handouts:  Recipe recourses    .   Video-Visit Details    Type of service:  Video Visit    Video Start Time (time video started): 3:55 pm    Video End Time (time video stopped): 4:12 pm    Originating Location (pt. Location): Home        Distant Location (provider location):  Off-site    Mode of Communication:  Video Conference via [facility]    Physician has received verbal consent for a Video Visit from the patient? Yes      Lauren Pate RD

## 2023-05-23 NOTE — PROGRESS NOTES
[facility] Non Surgical Follow up Visit   Date of visit: 5/30/2023  Physician: REBECCA Richardson MD, MD  Primary Care is Allison Carranza  Meera Grier   57 year old  female     Initial Weight: 189#  Initial BMI: 33.5  Today's Weight:   Wt Readings from Last 1 Encounters:   05/30/23 81.6 kg (180 lb)     Body mass index is 31.89 kg/m .           Assessment and Plan   Assessment: Meera is a 57 year old year old female who presents for medical weight management.      Plan:    1. Obesity (BMI 30-39.9)  Patient was congratulated on her success thus far. Healthy habits to assist with further weight loss were discussed. She will continue her exercise and portion control. She will start phentermine.  Risks, benefits and possible side effects discussed. She has stopped the metformin.     2. Prediabetes  This may improve with healthy habits and weight loss.    Follow up in 1 month with the dietician and in 3 months with myself           INTERIM HISTORY  Patient started metformin after her initial consultation in February. She felt that it caused increased cravings.     Goals established by patient with dietician 2/7/23:   1. Incorporate more protein in her diet. - met  2. Start portioning out the evening snacks vs grazing - met  3. practice portion control by eating off salad size plate or portion bowls - met     DIETARY HISTORY  Meals Per Day: 3  Eating Protein First?: usually  Food Diary: B:fruit and 1 egg and 1 piece of tony L: salad or chicken and vegetables D:protein and vegetables  Snacks Per Day: none  Fluid Intake: 64oz  Portion Control: improved  Meals at Restaurant per week:1-2    Positive Changes Since Last Visit: portion control, exercise, water intake  Struggling With: sleep    Knowledgeable in Reading Food Labels: yes  Getting Adequate Protein: yes  Sleeping 7-8 hours/day no    PHYSICAL ACTIVITY PATTERNS:  Cardio or weight training 30 minutes 4 days per week    REVIEW OF  "SYSTEMS  GENERAL/CONSTITUTIONAL:  Fatigue: no  HEENT:   glaucoma: no  CARDIOVASCULAR:  History of heart disease: no  PSYCHIATRIC:  Moods: stable  ENDOCRINE:  Monitoring Blood Sugars: no  Sugars Well Controlled: na  No personal or family history of medullary thyroid cancer not discussed  :  Birth control: menopause       Patient Profile   Social History     Social History Narrative     Not on file        Past Medical History   No past medical history on file.  Patient Active Problem List   Diagnosis     Thyroid nodule     Vitamin D deficiency     Elevated sed rate     External hemorrhoid     Prediabetes       Past Surgical History  She has a past surgical history that includes tubal ligation and Us Biopsy Thyroid Fine Needle Aspiration (3/31/2020).     Examination   /78   Ht 1.6 m (5' 3\")   Wt 81.6 kg (180 lb)   BMI 31.89 kg/m      Wt Readings from Last 4 Encounters:   05/30/23 81.6 kg (180 lb)   02/07/23 85.7 kg (189 lb)   09/06/22 88.5 kg (195 lb 3.2 oz)   11/30/21 83.6 kg (184 lb 3.2 oz)         GEN: Alert and oriented in no acute distress.   HEENT: mucous membranes moist  CV: RRR no MRG  ABDOMEN: mild protuberance       Counseling:   We reviewed the important post op bariatric recommendations:  -eating 3 meals daily  -eating protein first, getting >60gm protein daily  -eating slowly, chewing food well  -avoiding/limiting calorie containing beverages  -limiting starchy vegetables and carbohydrates, choosing wheat, not white with breads,   crackers, pastas, christiano, bagels, tortillas, rice  -limiting restaurant or cafeteria eating to twice a week or less    We discussed the importance of restorative sleep and stress management in maintaining a healthy weight.  We discussed the National Weight Control Registry healthy weight maintenance strategies and ways to optimize metabolism.  We discussed the importance of physical activity including cardiovascular and strength training in maintaining a healthier " weight.    Total time spent on the date of this encounter doing: chart review, review of test results, patient visit, physical exam, education, counseling, developing plan of care and documenting = 31 minutes.         REBECCA Richardson MD  [facility]

## 2023-05-23 NOTE — LETTER
5/23/2023         RE: Meera Grier  [address] [facility] [address]        Dear Colleague,    Thank you for referring your patient, Meera Grier, to the [facility] [place]. Please see a copy of my visit note below.    Meera Grier is a 57 year old who is being evaluated via a billable video visit.      How would you like to obtain your AVS? MyChart  If the video visit is dropped, the invitation should be resent by: Send to e-mail at: [email or web address]  Will anyone else be joining your video visit? No :[id number]}      Medical  Weight Loss Follow-Up Diet Evaluation  Assessment:  Meera is presenting today for a follow up weight management nutrition consultation.  This patient has had an initial appointment and was referred by Dr. Richardson for MNT as treatment for Obesity which is impacting her depression.   Weight loss medication: Metformin.   Pt's weight is unknown as she doesn't have a scale at home.  Initial weight: 189 lbs  Weight change: n/a         View : No data to display.              BMI: 33.48 kg/m2      Estimated RMR ([place]-St Jeor equation):  1420 kcals x 1.3 (light active) = 1950 kcals (for weight maintenance)  Recommended Protein Intake: 60-80 grams of protein/day  Patient Active Problem List:  Patient Active Problem List   Diagnosis     Thyroid nodule     Vitamin D deficiency     Elevated sed rate     External hemorrhoid     Prediabetes     Progress on goals from last visit: Patient reports things are going well. She has made great progress. Noted her portion sizes are smaller and hydration via water increased significantly. Is seeing non scale related positive changes (clothing fitting looser, stomach shrinking). No longer grazing in the evening time, stops eating after 7pm. States she feels great and she does not have any stomach pains or issues. Energy levels are high. States the biggest change that has the most helpful is  controlling her portion sizes.     +great mindful choices   +eating pleasure foods in moderation.   +eating half of meals that may be larger and eating the other half at other meal.    1. Incorporate more protein in her diet. - met   2. Start portioning out the evening snacks vs grazing - met   3. practice portion control by eating off salad size plate or portion bowls - met     Dietary Recall:  Breakfast: egg, tony and fruit or 1/2 omelet   Lunch: other 1/2 omelet   Dinner: roasted garlic chicken breast and cauliflower   Typical snacks: portion controled popcorn, fruit   Eating out: 1x per week   Beverages: Water, coffee   Exercise: Goes to the gym to lift weights, cardio, 30 min every day.  Nutrition Diagnosis:    Obesity related to overeating and poor lifestyle habits as evidenced by patient report of large portions, low protein intake and BMI 33.4 kg/m2      Intervention:  1. Food and/or nutrient delivery:   a. discussed benefits of portion control when snacking and/or eating meals.   2. Nutrition education:  a. Educated patient that with diet and exercise her weight may not drastically change as she is lifting weights but the non scale victories and just as rewarding.  3. Nutrition counseling:   a. congratulated patient on success with nutritionally changes and habits. Discussed non scale victories to recognize.       Monitoring/Evaluation:    Goals:  1. continue with current goals in place.       Patient to follow up in 6 month(s) with RD    Handouts:  Recipe recourses    .   Video-Visit Details    Type of service:  Video Visit    Video Start Time (time video started): 3:55 pm    Video End Time (time video stopped): 4:12 pm    Originating Location (pt. Location): Home        Distant Location (provider location):  Off-site    Mode of Communication:  Video Conference via [employer]    Physician has received verbal consent for a Video Visit from the patient? Yes      Lauren Pate RD            Again, thank  you for allowing me to participate in the care of your patient.        Sincerely,        Lauren Pate RD

## 2023-05-30 ENCOUNTER — OFFICE VISIT (OUTPATIENT)
Dept: SURGERY | Facility: CLINIC | Age: 58
End: 2023-05-30
Payer: COMMERCIAL

## 2023-05-30 VITALS
HEIGHT: 63 IN | BODY MASS INDEX: 31.89 KG/M2 | DIASTOLIC BLOOD PRESSURE: 78 MMHG | SYSTOLIC BLOOD PRESSURE: 124 MMHG | WEIGHT: 180 LBS

## 2023-05-30 DIAGNOSIS — R73.03 PREDIABETES: ICD-10-CM

## 2023-05-30 DIAGNOSIS — E66.9 OBESITY (BMI 30-39.9): Primary | ICD-10-CM

## 2023-05-30 PROCEDURE — 99214 OFFICE O/P EST MOD 30 MIN: CPT | Performed by: FAMILY MEDICINE

## 2023-05-30 RX ORDER — PHENTERMINE HYDROCHLORIDE 37.5 MG/1
TABLET ORAL
Qty: 90 TABLET | Refills: 0 | Status: SHIPPED | OUTPATIENT
Start: 2023-05-30 | End: 2024-01-03

## 2023-05-30 NOTE — LETTER
5/30/2023         RE: Meera Grier  [address] [address]        Dear Colleague,    Thank you for referring your patient, Meera Grier, to the [facility] [place]. Please see a copy of my visit note below.    Bariatric Care Clinic Non Surgical Follow up Visit   Date of visit: 5/30/2023  Physician: REBECCA Richardson MD, MD  Primary Care is Allison Carranza.  Meera Grier   57 year old  female     Initial Weight: 189#  Initial BMI: 33.5  Today's Weight:   Wt Readings from Last 1 Encounters:   05/30/23 81.6 kg (180 lb)     Body mass index is 31.89 kg/m .           Assessment and Plan   Assessment: Meera is a 57 year old year old female who presents for medical weight management.      Plan:    1. Obesity (BMI 30-39.9)  Patient was congratulated on her success thus far. Healthy habits to assist with further weight loss were discussed. She will continue her exercise and portion control. She will start phentermine.  Risks, benefits and possible side effects discussed. She has stopped the metformin.     2. Prediabetes  This may improve with healthy habits and weight loss.    Follow up in 1 month with the dietician and in 3 months with myself           INTERIM HISTORY  Patient started metformin after her initial consultation in February. She felt that it caused increased cravings.     Goals established by patient with dietician 2/7/23:   1. Incorporate more protein in her diet. - met  2. Start portioning out the evening snacks vs grazing - met  3. practice portion control by eating off salad size plate or portion bowls - met     DIETARY HISTORY  Meals Per Day: 3  Eating Protein First?: usually  Food Diary: B:fruit and 1 egg and 1 piece of tony L: salad or chicken and vegetables D:protein and vegetables  Snacks Per Day: none  Fluid Intake: 64oz  Portion Control: improved  Meals at Restaurant per week:1-2    Positive Changes Since Last Visit:  "portion control, exercise, water intake  Struggling With: sleep    Knowledgeable in Reading Food Labels: yes  Getting Adequate Protein: yes  Sleeping 7-8 hours/day no    PHYSICAL ACTIVITY PATTERNS:  Cardio or weight training 30 minutes 4 days per week    REVIEW OF SYSTEMS  GENERAL/CONSTITUTIONAL:  Fatigue: no  HEENT:   glaucoma: no  CARDIOVASCULAR:  History of heart disease: no  PSYCHIATRIC:  Moods: stable  ENDOCRINE:  Monitoring Blood Sugars: no  Sugars Well Controlled: na  No personal or family history of medullary thyroid cancer not discussed  :  Birth control: menopause       Patient Profile   Social History     Social History Narrative     Not on file        Past Medical History   No past medical history on file.  Patient Active Problem List   Diagnosis     Thyroid nodule     Vitamin D deficiency     Elevated sed rate     External hemorrhoid     Prediabetes       Past Surgical History  She has a past surgical history that includes tubal ligation and Us Biopsy Thyroid Fine Needle Aspiration (3/31/2020).     Examination   /78   Ht 1.6 m (5' 3\")   Wt 81.6 kg (180 lb)   BMI 31.89 kg/m      Wt Readings from Last 4 Encounters:   05/30/23 81.6 kg (180 lb)   02/07/23 85.7 kg (189 lb)   09/06/22 88.5 kg (195 lb 3.2 oz)   11/30/21 83.6 kg (184 lb 3.2 oz)         GEN: Alert and oriented in no acute distress.   HEENT: mucous membranes moist  CV: RRR no MRG  ABDOMEN: mild protuberance       Counseling:   We reviewed the important post op bariatric recommendations:  -eating 3 meals daily  -eating protein first, getting >60gm protein daily  -eating slowly, chewing food well  -avoiding/limiting calorie containing beverages  -limiting starchy vegetables and carbohydrates, choosing wheat, not white with breads,   crackers, pastas, christiano, bagels, tortillas, rice  -limiting restaurant or cafeteria eating to twice a week or less    We discussed the importance of restorative sleep and stress management in maintaining a " healthy weight.  We discussed the National Weight Control Registry healthy weight maintenance strategies and ways to optimize metabolism.  We discussed the importance of physical activity including cardiovascular and strength training in maintaining a healthier weight.    Total time spent on the date of this encounter doing: chart review, review of test results, patient visit, physical exam, education, counseling, developing plan of care and documenting = 31 minutes.         REBECCA Richardson MD  [facility]             Again, thank you for allowing me to participate in the care of your patient.        Sincerely,        REBECCA Richardson MD

## 2023-07-03 ENCOUNTER — TELEPHONE (OUTPATIENT)
Dept: SURGERY | Facility: CLINIC | Age: 58
End: 2023-07-03
Payer: COMMERCIAL

## 2023-07-03 DIAGNOSIS — R73.03 PREDIABETES: ICD-10-CM

## 2023-07-03 DIAGNOSIS — E66.01 CLASS 2 SEVERE OBESITY DUE TO EXCESS CALORIES WITH SERIOUS COMORBIDITY AND BODY MASS INDEX (BMI) OF 35.0 TO 35.9 IN ADULT (H): ICD-10-CM

## 2023-07-03 DIAGNOSIS — E66.812 CLASS 2 SEVERE OBESITY DUE TO EXCESS CALORIES WITH SERIOUS COMORBIDITY AND BODY MASS INDEX (BMI) OF 35.0 TO 35.9 IN ADULT (H): ICD-10-CM

## 2023-07-03 RX ORDER — METFORMIN HCL 500 MG
1000 TABLET, EXTENDED RELEASE 24 HR ORAL
Qty: 180 TABLET | Refills: 1 | Status: SHIPPED | OUTPATIENT
Start: 2023-07-03 | End: 2023-09-14

## 2023-07-03 NOTE — TELEPHONE ENCOUNTER
Patient called and left a message saying she had lost her bottle of pills and would like a replacement.  Wasn't able to talk to her upon the return phone call but if it was the metformin, we can refill those for her but the phentermine is a controlled substance and we won't be able to refill that until she is due for her next refill which would be at the end of August.  Let the patient know this and encouraged her to call with any questions or concerns.      Krysta Yeager RN

## 2023-09-14 ENCOUNTER — VIRTUAL VISIT (OUTPATIENT)
Dept: SURGERY | Facility: CLINIC | Age: 58
End: 2023-09-14
Payer: COMMERCIAL

## 2023-09-14 DIAGNOSIS — R73.03 PREDIABETES: ICD-10-CM

## 2023-09-14 DIAGNOSIS — E66.9 OBESITY (BMI 30-39.9): Primary | ICD-10-CM

## 2023-09-14 PROCEDURE — 99213 OFFICE O/P EST LOW 20 MIN: CPT | Mod: VID | Performed by: FAMILY MEDICINE

## 2023-09-14 RX ORDER — PHENTERMINE HYDROCHLORIDE 37.5 MG/1
TABLET ORAL
Qty: 90 TABLET | Refills: 0 | Status: SHIPPED | OUTPATIENT
Start: 2023-09-14 | End: 2024-01-06 | Stop reason: ALTCHOICE

## 2023-09-14 NOTE — PROGRESS NOTES
Meera Grier is 57 year old  female who presents for a billable video visit today.    How would you like to obtain your AVS? MyChart  If dropped from the video visit, the video invitation should be resent by: Text to cell phone: [phone]  Will anyone else be joining your video visit? No      Video Start Time:  2:41 pm    Are there any specific questions or needs that you would like addressed at your visit today? Phentermine refill         Video-Visit Details    Type of service:  Video Visit    Platform used for Video Visit: Wander    Video End Time (time video stopped): 2:58 PM    Originating Location (pt. Location): Home      Distant Location (provider location):  Off-site    Distant Location (provider location):  [facility] [place]

## 2023-09-14 NOTE — PROGRESS NOTES
Bariatric Care Clinic Non Surgical Follow up Visit   Date of visit: 9/14/2023  Physician: REBECCA Richardson MD, MD  Primary Care is Allison Carranza  Meera Grier   57 year old  female     Initial Weight: 189#  Initial BMI: 33.5  Today's Weight:   Wt Readings from Last 1 Encounters:   05/30/23 81.6 kg (180 lb)     There is no height or weight on file to calculate BMI.           Assessment and Plan   Assessment: Meera is a 57 year old year old female who presents for medical weight management.      Plan:    1. Obesity (BMI 30-39.9)  Patient was congratulated on her success thus far. Healthy habits to assist with further weight loss were discussed. She will work on increasing her water intake. She will restart the phentermine. We discussed the patient's co-morbid conditions including prediabetes. These likely will improve with healthy habits and weight loss.     2. Prediabetes  This may improve with healthy habits and weight loss.      Follow up in 3 months with myself           INTERIM HISTORY  Patient started phentermine after her visit in May. She lost her pills julian she has not taken it for over 2 months. She did find it very helpful to control her appetite in the month that she took it. She does not know what her current weight.    DIETARY HISTORY  Meals Per Day: 2-3  Eating Protein First?: someitmes  Food Diary: B:1 egg and 1 piece of tony and 1 hash brown sundeep (at work) L:small salad and fruit  D:sometimes skips  Fluid Intake: water throughout the day  Portion Control: yes  Calorie Containing Beverages: none      Positive Changes Since Last Visit: She has stopped eating after 8:00 or 9:00 at time  Struggling With: stress eating    Knowledgeable in Reading Food Labels: yes  Getting Adequate Protein: sometimes  Sleeping 7-8 hours/day no  Stress management she tends to go to food    PHYSICAL ACTIVITY PATTERNS:  30 minutes 5 days per week, cardio some days, strength other days    REVIEW OF  SYSTEMS  GENERAL/CONSTITUTIONAL:  Fatigue: yes  HEENT:   glaucoma: no  CARDIOVASCULAR:  History of heart disease: no  PULMONARY:  Dyspnea on exertion: sometimes  PSYCHIATRIC:  Moods: stressed  ENDOCRINE:  Monitoring Blood Sugars: no  Sugars Well Controlled: na  :  Birth control: menopause       Patient Profile   Social History     Social History Narrative    Not on file        Past Medical History   History reviewed. No pertinent past medical history.  Patient Active Problem List   Diagnosis    Thyroid nodule    Vitamin D deficiency    Elevated sed rate    External hemorrhoid    Prediabetes       Past Surgical History  She has a past surgical history that includes tubal ligation and Us Biopsy Thyroid Fine Needle Aspiration (3/31/2020).     Examination   There were no vitals taken for this visit.  Wt Readings from Last 4 Encounters:   05/30/23 81.6 kg (180 lb)   02/07/23 85.7 kg (189 lb)   09/06/22 88.5 kg (195 lb 3.2 oz)   11/30/21 83.6 kg (184 lb 3.2 oz)      GENERAL: Healthy, alert and no distress  EYES: Eyes grossly normal to inspection.  No discharge or erythema, or obvious scleral/conjunctival abnormalities.  RESP: No audible wheeze, cough, or visible cyanosis.  No visible retractions or increased work of breathing.    SKIN: Visible skin clear. No significant rash, abnormal pigmentation or lesions.  NEURO: Cranial nerves grossly intact.  Mentation and speech appropriate for age.  PSYCH: Mentation appears normal, affect normal/bright, judgement and insight intact, normal speech and appearance well-groomed.        Counseling:   We reviewed the important post op bariatric recommendations:  -eating 3 meals daily  -eating protein first, getting >60gm protein daily  -eating slowly, chewing food well  -avoiding/limiting calorie containing beverages  -limiting starchy vegetables and carbohydrates, choosing wheat, not white with breads,   crackers, pastas, christiano, bagels, tortillas, rice  -limiting restaurant or  cafeteria eating to twice a week or less    We discussed the importance of restorative sleep and stress management in maintaining a healthy weight.  We discussed the National Weight Control Registry healthy weight maintenance strategies and ways to optimize metabolism.  We discussed the importance of physical activity including cardiovascular and strength training in maintaining a healthier weight.    Total time spent on the date of this encounter doing: chart review, review of test results, patient visit, physical exam, education, counseling, developing plan of care and documenting = 25 minutes.         REBECCA Richardson MD  [facility] [place] [facility]

## 2023-09-14 NOTE — LETTER
9/14/2023         RE: Meera Grier  [address] [address]        Dear Colleague,    Thank you for referring your patient, Meera Grier, to the [facility] [place]. Please see a copy of my visit note below.    Bariatric Care Clinic Non Surgical Follow up Visit   Date of visit: 9/14/2023  Physician: REBECCA Richardson MD, MD  Primary Care is Allison Carranza.  Meera Grier   57 year old  female     Initial Weight: 189#  Initial BMI: 33.5  Today's Weight:   Wt Readings from Last 1 Encounters:   05/30/23 81.6 kg (180 lb)     There is no height or weight on file to calculate BMI.           Assessment and Plan   Assessment: Meera is a 57 year old year old female who presents for medical weight management.      Plan:    1. Obesity (BMI 30-39.9)  Patient was congratulated on her success thus far. Healthy habits to assist with further weight loss were discussed. She will work on increasing her water intake. She will restart the phentermine. We discussed the patient's co-morbid conditions including prediabetes. These likely will improve with healthy habits and weight loss.     2. Prediabetes  This may improve with healthy habits and weight loss.      Follow up in 3 months with myself           INTERIM HISTORY  Patient started phentermine after her visit in May. She lost her pills julian she has not taken it for over 2 months. She did find it very helpful to control her appetite in the month that she took it. She does not know what her current weight.    DIETARY HISTORY  Meals Per Day: 2-3  Eating Protein First?: someitmes  Food Diary: B:1 egg and 1 piece of tony and 1 hash brown sundeep (at work) L:small salad and fruit  D:sometimes skips  Fluid Intake: water throughout the day  Portion Control: yes  Calorie Containing Beverages: none      Positive Changes Since Last Visit: She has stopped eating after 8:00 or 9:00 at time  Struggling With: stress  eating    Knowledgeable in Reading Food Labels: yes  Getting Adequate Protein: sometimes  Sleeping 7-8 hours/day no  Stress management she tends to go to food    PHYSICAL ACTIVITY PATTERNS:  30 minutes 5 days per week, cardio some days, strength other days    REVIEW OF SYSTEMS  GENERAL/CONSTITUTIONAL:  Fatigue: yes  HEENT:   glaucoma: no  CARDIOVASCULAR:  History of heart disease: no  PULMONARY:  Dyspnea on exertion: sometimes  PSYCHIATRIC:  Moods: stressed  ENDOCRINE:  Monitoring Blood Sugars: no  Sugars Well Controlled: na  :  Birth control: menopause       Patient Profile   Social History     Social History Narrative     Not on file        Past Medical History   History reviewed. No pertinent past medical history.  Patient Active Problem List   Diagnosis     Thyroid nodule     Vitamin D deficiency     Elevated sed rate     External hemorrhoid     Prediabetes       Past Surgical History  She has a past surgical history that includes tubal ligation and Us Biopsy Thyroid Fine Needle Aspiration (3/31/2020).     Examination   There were no vitals taken for this visit.  Wt Readings from Last 4 Encounters:   05/30/23 81.6 kg (180 lb)   02/07/23 85.7 kg (189 lb)   09/06/22 88.5 kg (195 lb 3.2 oz)   11/30/21 83.6 kg (184 lb 3.2 oz)      GENERAL: Healthy, alert and no distress  EYES: Eyes grossly normal to inspection.  No discharge or erythema, or obvious scleral/conjunctival abnormalities.  RESP: No audible wheeze, cough, or visible cyanosis.  No visible retractions or increased work of breathing.    SKIN: Visible skin clear. No significant rash, abnormal pigmentation or lesions.  NEURO: Cranial nerves grossly intact.  Mentation and speech appropriate for age.  PSYCH: Mentation appears normal, affect normal/bright, judgement and insight intact, normal speech and appearance well-groomed.        Counseling:   We reviewed the important post op bariatric recommendations:  -eating 3 meals daily  -eating protein first,  getting >60gm protein daily  -eating slowly, chewing food well  -avoiding/limiting calorie containing beverages  -limiting starchy vegetables and carbohydrates, choosing wheat, not white with breads,   crackers, pastas, christiano, bagels, tortillas, rice  -limiting restaurant or cafeteria eating to twice a week or less    We discussed the importance of restorative sleep and stress management in maintaining a healthy weight.  We discussed the National Weight Control Registry healthy weight maintenance strategies and ways to optimize metabolism.  We discussed the importance of physical activity including cardiovascular and strength training in maintaining a healthier weight.    Total time spent on the date of this encounter doing: chart review, review of test results, patient visit, physical exam, education, counseling, developing plan of care and documenting = 25 minutes.         REBECCA Richardson MD  [facility]             Meera Grier is 57 year old  female who presents for a billable video visit today.    How would you like to obtain your AVS? MyChart  If dropped from the video visit, the video invitation should be resent by: Text to cell phone: [phone]  Will anyone else be joining your video visit? No      Video Start Time:  2:41 pm    Are there any specific questions or needs that you would like addressed at your visit today? Phentermine refill         Video-Visit Details    Type of service:  Video Visit    Platform used for Video Visit: Anipipo    Video End Time (time video stopped): 2:58 PM    Originating Location (pt. Location): Home      Distant Location (provider location):  Off-site    Distant Location (provider location):  [facility] [place]        Again, thank you for allowing me to participate in the care of your patient.        Sincerely,        REBECCA Richardson MD

## 2023-11-10 ENCOUNTER — VIRTUAL VISIT (OUTPATIENT)
Dept: SURGERY | Facility: CLINIC | Age: 58
End: 2023-11-10
Payer: COMMERCIAL

## 2023-11-10 DIAGNOSIS — E66.9 OBESITY (BMI 30-39.9): Primary | ICD-10-CM

## 2023-11-10 DIAGNOSIS — Z71.3 NUTRITIONAL COUNSELING: ICD-10-CM

## 2023-11-10 PROCEDURE — 97803 MED NUTRITION INDIV SUBSEQ: CPT | Mod: 93

## 2023-11-10 NOTE — PROGRESS NOTES
Meera Grier is a 57 year old who is being evaluated via a billable video visit.      How would you like to obtain your AVS? MyChart  If the video visit is dropped, the invitation should be resent by: Send to e-mail at: [email or web address]  Will anyone else be joining your video visit? No :[id number]}      Medical  Weight Loss Follow-Up Diet Evaluation  Assessment:  Meera is presenting today for a follow up weight management nutrition consultation.  This patient has had an initial appointment and was referred by Dr. Richardson for MNT as treatment for Obesity which is impacting her depression.   Weight loss medication: Phentermine   Pt's weight is: 172 lbs  Initial weight: 189 lbs  Weight change: 17 lbs down        9/14/2023     2:41 PM   Changes and Difficulties   I have made the following changes to my diet since my last visit: Same   With regards to my diet, I am still struggling with: Fatigued sometimes   I have made the following changes to my activity/exercise since my last visit: Same   With regards to my activity/exercise, I am still struggling with: Weight     BMI: 30.47 kg/m2    Estimated RMR (Warren-St Jeor equation):  1420 kcals x 1.3 (light active) = 1950 kcals (for weight maintenance)  Recommended Protein Intake: 60-80 grams of protein/day  Patient Active Problem List:  Patient Active Problem List   Diagnosis    Thyroid nodule    Vitamin D deficiency    Elevated sed rate    External hemorrhoid    Prediabetes     Progress on goals from last visit: Patient reports she is doing very well. Continues going to the gym daily. Energy levels are great, hydration intake has increased. Struggling with skipping dinner. No labs to review at this time.  +great mindful eating choices   +eating pleasure foods in moderation.   +eating half of meals that may be larger and eating the other half at other meals.      Dietary Recall:  Breakfast: 1 egg egg, 1 piece of tony, 1 hashbrown and/or fruit or 1/2 omelet    Lunch: leftovers Or other 1/2 of omelet Or small salad with chicken breast and fruit  Dinner: yogurt Or leftovers   Typical snacks: portion controled popcorn, fruit, small pack of fruit snacks    Eating out: 1x per week   Beverages: Water 64 oz +, coffee   Exercise: Goes to the gym daily to lift weights, cardio, 30 min every day.  Nutrition Diagnosis:    Obesity related to nutrition knowledge deficit as evidence by patint subjective reprots, low protein itnake, skipping meals and BMI of 31.89      Intervention:  Food and/or nutrient delivery:   discussed benefits of portion control when snacking and/or eating meals.   Encouraged patient to incorporate a protein shake during the day instead of skipping meals   Nutrition counseling:   congratulated patient on success with nutritionally changes and habits. Discussed non scale victories to recognize.       Monitoring/Evaluation:    Goals:  continue with current goals in place.   Look into a protein supplement for increase       Patient to follow up in 6 month(s) with RD    Handouts:  5 day sample     .   Virtual Visit Details    Type of service:  Telephone Visit   Phone call duration: 24 minutes   Originating Location (pt. Location): Home        Distant Location (provider location):  Off-site    Mode of Communication:  Video Conference via [facility]    Physician has received verbal consent for a Video Visit from the patient? Yes      Lauren Pate RD

## 2023-11-10 NOTE — PATIENT INSTRUCTIONS
Here is an example of breakfast, lunch and dinners    Day 1    Breakfast   2 scrambled eggs  1 slice whole-grain toast  1/2 avocado  Lunch   Grilled chicken salad with mixed greens, cherry tomatoes, cucumber, and a vinaigrette dressing  Snack   Greek yogurt (non-fat) with a handful of berries  Dinner  Baked salmon fillet  Quinoa  Steamed broccoli and carrots      Herbal tea or water    Day 2:    Breakfast   Smoothie with spinach, banana, protein powder, and almond milk  Lunch  Turkey and hummus wrap with whole-grain tortilla and veggies  Snack   Cottage cheese with pineapple  Dinner   Stir-fried tofu with mixed vegetables and brown rice      Water or herbal tea    Day 3:    Breakfast   Oatmeal with almond milk, topped with sliced strawberries and almonds  Lunch   Lentil soup with a side of mixed greens  Snack   Handful of mixed nuts (almonds, walnuts, pistachios)  Dinner   Grilled chicken breast with sweet potato and green beans      Herbal tea or water    Day 4:    Breakfast   Greek yogurt parfait with granola and berries  Lunch   Quinoa salad with black beans, corn, cherry tomatoes, and avocado  Snack   Hard-boiled eggs (2 eggs)  Dinner   Baked cod fillet with roasted [place] sprouts and quinoa      Water or herbal tea    Day 5:    Breakfast   Whole-grain toast with peanut butter and banana slices  Lunch   Chickpea salad with mixed vegetables and feta cheese  Snack  Cottage cheese with sliced peaches  Dinner   Grilled shrimp skewers with a quinoa and vegetable medley      Herbal tea or water

## 2023-11-10 NOTE — LETTER
11/10/2023         RE: Meera Grier  [address] [facility] [address]        Dear Colleague,    Thank you for referring your patient, Meera Grier, to the [facility] SURGERY [facility] AND [facility]ATRICS CARE [place]. Please see a copy of my visit note below.    Meera Grier is a 57 year old who is being evaluated via a billable video visit.      How would you like to obtain your AVS? MyChart  If the video visit is dropped, the invitation should be resent by: Send to e-mail at: [email or web address]  Will anyone else be joining your video visit? No :[phone]}      Medical  Weight Loss Follow-Up Diet Evaluation  Assessment:  Meera is presenting today for a follow up weight management nutrition consultation.  This patient has had an initial appointment and was referred by Dr. Richardson for MNT as treatment for Obesity which is impacting her depression.   Weight loss medication: Phentermine   Pt's weight is: 172 lbs  Initial weight: 189 lbs  Weight change: 17 lbs down        9/14/2023     2:41 PM   Changes and Difficulties   I have made the following changes to my diet since my last visit: Same   With regards to my diet, I am still struggling with: Fatigued sometimes   I have made the following changes to my activity/exercise since my last visit: Same   With regards to my activity/exercise, I am still struggling with: Weight     BMI: 30.47 kg/m2    Estimated RMR (Alden-St Jeor equation):  1420 kcals x 1.3 (light active) = 1950 kcals (for weight maintenance)  Recommended Protein Intake: 60-80 grams of protein/day  Patient Active Problem List:  Patient Active Problem List   Diagnosis     Thyroid nodule     Vitamin D deficiency     Elevated sed rate     External hemorrhoid     Prediabetes     Progress on goals from last visit: Patient reports she is doing very well. Continues going to the gym daily. Energy levels are great, hydration intake has increased. Struggling with skipping dinner. No  labs to review at this time.  +great mindful eating choices   +eating pleasure foods in moderation.   +eating half of meals that may be larger and eating the other half at other meals.      Dietary Recall:  Breakfast: 1 egg egg, 1 piece of tony, 1 hashbrown and/or fruit or 1/2 omelet   Lunch: leftovers Or other 1/2 of omelet Or small salad with chicken breast and fruit  Dinner: yogurt Or leftovers   Typical snacks: portion controled popcorn, fruit, small pack of fruit snacks    Eating out: 1x per week   Beverages: Water 64 oz +, coffee   Exercise: Goes to the gym daily to lift weights, cardio, 30 min every day.  Nutrition Diagnosis:    Obesity related to nutrition knowledge deficit as evidence by patint subjective reprots, low protein itnake, skipping meals and BMI of 31.89      Intervention:  Food and/or nutrient delivery:   discussed benefits of portion control when snacking and/or eating meals.   Encouraged patient to incorporate a protein shake during the day instead of skipping meals   Nutrition counseling:   congratulated patient on success with nutritionally changes and habits. Discussed non scale victories to recognize.       Monitoring/Evaluation:    Goals:  continue with current goals in place.   Look into a protein supplement for increase       Patient to follow up in 6 month(s) with RD    Handouts:  5 day sample     .   Virtual Visit Details    Type of service:  Telephone Visit   Phone call duration: 24 minutes   Originating Location (pt. Location): Home        Distant Location (provider location):  Off-site    Mode of Communication:  [facility] Conference [facility]    Physician has received verbal consent for a Video Visit from the patient? Yes      Lauren Pate RD          Again, thank you for allowing me to participate in the care of your patient.        Sincerely,        Lauren Pate RD

## 2024-01-03 ENCOUNTER — MYC REFILL (OUTPATIENT)
Dept: SURGERY | Facility: CLINIC | Age: 59
End: 2024-01-03
Payer: COMMERCIAL

## 2024-01-03 DIAGNOSIS — E66.9 OBESITY (BMI 30-39.9): ICD-10-CM

## 2024-01-04 RX ORDER — PHENTERMINE HYDROCHLORIDE 37.5 MG/1
TABLET ORAL
Qty: 90 TABLET | Refills: 0 | Status: SHIPPED | OUTPATIENT
Start: 2024-01-04 | End: 2024-01-06 | Stop reason: ALTCHOICE

## 2024-01-06 DIAGNOSIS — E66.811 OBESITY (BMI 30.0-34.9): Primary | ICD-10-CM

## 2024-01-06 RX ORDER — PHENTERMINE HYDROCHLORIDE 37.5 MG/1
37.5 CAPSULE ORAL EVERY MORNING
Qty: 90 CAPSULE | Refills: 0 | Status: SHIPPED | OUTPATIENT
Start: 2024-01-06

## 2024-01-08 ENCOUNTER — OFFICE VISIT (OUTPATIENT)
Dept: FAMILY MEDICINE | Facility: CLINIC | Age: 59
End: 2024-01-08
Payer: COMMERCIAL

## 2024-01-08 VITALS
HEART RATE: 60 BPM | DIASTOLIC BLOOD PRESSURE: 63 MMHG | HEIGHT: 63 IN | BODY MASS INDEX: 30.3 KG/M2 | WEIGHT: 171 LBS | TEMPERATURE: 97.6 F | SYSTOLIC BLOOD PRESSURE: 115 MMHG | OXYGEN SATURATION: 100 % | RESPIRATION RATE: 13 BRPM

## 2024-01-08 DIAGNOSIS — E04.1 THYROID NODULE: ICD-10-CM

## 2024-01-08 DIAGNOSIS — R73.03 PREDIABETES: ICD-10-CM

## 2024-01-08 DIAGNOSIS — Z13.220 LIPID SCREENING: ICD-10-CM

## 2024-01-08 DIAGNOSIS — E66.811 CLASS 1 OBESITY DUE TO EXCESS CALORIES WITH SERIOUS COMORBIDITY AND BODY MASS INDEX (BMI) OF 30.0 TO 30.9 IN ADULT: ICD-10-CM

## 2024-01-08 DIAGNOSIS — Z12.4 CERVICAL CANCER SCREENING: ICD-10-CM

## 2024-01-08 DIAGNOSIS — Z11.3 SCREENING FOR STD (SEXUALLY TRANSMITTED DISEASE): ICD-10-CM

## 2024-01-08 DIAGNOSIS — E66.09 CLASS 1 OBESITY DUE TO EXCESS CALORIES WITH SERIOUS COMORBIDITY AND BODY MASS INDEX (BMI) OF 30.0 TO 30.9 IN ADULT: ICD-10-CM

## 2024-01-08 DIAGNOSIS — G47.00 INSOMNIA, UNSPECIFIED TYPE: ICD-10-CM

## 2024-01-08 DIAGNOSIS — Z11.59 NEED FOR HEPATITIS C SCREENING TEST: ICD-10-CM

## 2024-01-08 DIAGNOSIS — Z00.00 ENCOUNTER FOR ROUTINE HISTORY AND PHYSICAL EXAM IN FEMALE: Primary | ICD-10-CM

## 2024-01-08 LAB
ALBUMIN UR-MCNC: NEGATIVE MG/DL
APPEARANCE UR: CLEAR
BILIRUB UR QL STRIP: NEGATIVE
COLOR UR AUTO: YELLOW
ERYTHROCYTE [DISTWIDTH] IN BLOOD BY AUTOMATED COUNT: 11.8 % (ref 10–15)
GLUCOSE UR STRIP-MCNC: NEGATIVE MG/DL
HBA1C MFR BLD: 5.3 % (ref 0–5.6)
HCT VFR BLD AUTO: 35.2 % (ref 35–47)
HCV AB SERPL QL IA: NONREACTIVE
HGB BLD-MCNC: 11.7 G/DL (ref 11.7–15.7)
HGB UR QL STRIP: NEGATIVE
KETONES UR STRIP-MCNC: NEGATIVE MG/DL
LEUKOCYTE ESTERASE UR QL STRIP: NEGATIVE
MCH RBC QN AUTO: 31.4 PG (ref 26.5–33)
MCHC RBC AUTO-ENTMCNC: 33.2 G/DL (ref 31.5–36.5)
MCV RBC AUTO: 94 FL (ref 78–100)
NITRATE UR QL: NEGATIVE
PH UR STRIP: 7 [PH] (ref 5–8)
PLATELET # BLD AUTO: 364 10E3/UL (ref 150–450)
RBC # BLD AUTO: 3.73 10E6/UL (ref 3.8–5.2)
SP GR UR STRIP: 1.02 (ref 1–1.03)
UROBILINOGEN UR STRIP-ACNC: 1 E.U./DL
WBC # BLD AUTO: 5.4 10E3/UL (ref 4–11)

## 2024-01-08 PROCEDURE — 87491 CHLMYD TRACH DNA AMP PROBE: CPT | Performed by: NURSE PRACTITIONER

## 2024-01-08 PROCEDURE — 80053 COMPREHEN METABOLIC PANEL: CPT | Performed by: NURSE PRACTITIONER

## 2024-01-08 PROCEDURE — 87591 N.GONORRHOEAE DNA AMP PROB: CPT | Performed by: NURSE PRACTITIONER

## 2024-01-08 PROCEDURE — 99396 PREV VISIT EST AGE 40-64: CPT | Performed by: NURSE PRACTITIONER

## 2024-01-08 PROCEDURE — 87389 HIV-1 AG W/HIV-1&-2 AB AG IA: CPT | Performed by: NURSE PRACTITIONER

## 2024-01-08 PROCEDURE — 80061 LIPID PANEL: CPT | Performed by: NURSE PRACTITIONER

## 2024-01-08 PROCEDURE — 86803 HEPATITIS C AB TEST: CPT | Performed by: NURSE PRACTITIONER

## 2024-01-08 PROCEDURE — 99213 OFFICE O/P EST LOW 20 MIN: CPT | Mod: 25 | Performed by: NURSE PRACTITIONER

## 2024-01-08 PROCEDURE — 83036 HEMOGLOBIN GLYCOSYLATED A1C: CPT | Performed by: NURSE PRACTITIONER

## 2024-01-08 PROCEDURE — 84443 ASSAY THYROID STIM HORMONE: CPT | Performed by: NURSE PRACTITIONER

## 2024-01-08 PROCEDURE — 36415 COLL VENOUS BLD VENIPUNCTURE: CPT | Performed by: NURSE PRACTITIONER

## 2024-01-08 PROCEDURE — G0145 SCR C/V CYTO,THINLAYER,RESCR: HCPCS | Performed by: NURSE PRACTITIONER

## 2024-01-08 PROCEDURE — 86780 TREPONEMA PALLIDUM: CPT | Performed by: NURSE PRACTITIONER

## 2024-01-08 PROCEDURE — 85027 COMPLETE CBC AUTOMATED: CPT | Performed by: NURSE PRACTITIONER

## 2024-01-08 PROCEDURE — 81003 URINALYSIS AUTO W/O SCOPE: CPT | Performed by: NURSE PRACTITIONER

## 2024-01-08 PROCEDURE — 87624 HPV HI-RISK TYP POOLED RSLT: CPT | Performed by: NURSE PRACTITIONER

## 2024-01-08 RX ORDER — HYDROXYZINE PAMOATE 50 MG/1
50-100 CAPSULE ORAL
Qty: 60 CAPSULE | Refills: 2 | Status: SHIPPED | OUTPATIENT
Start: 2024-01-08

## 2024-01-08 ASSESSMENT — ENCOUNTER SYMPTOMS
HEARTBURN: 0
HEMATOCHEZIA: 0
PALPITATIONS: 0
CONSTIPATION: 0
SHORTNESS OF BREATH: 0
MYALGIAS: 0
COUGH: 0
JOINT SWELLING: 0
ARTHRALGIAS: 0
WEAKNESS: 0
HEADACHES: 0
NAUSEA: 0
HEMATURIA: 0
FREQUENCY: 0
EYE PAIN: 0
BREAST MASS: 0
DYSURIA: 0
ABDOMINAL PAIN: 0
FEVER: 0
PARESTHESIAS: 0
CHILLS: 0
NERVOUS/ANXIOUS: 0
DIZZINESS: 0
SORE THROAT: 0
DIARRHEA: 0

## 2024-01-08 ASSESSMENT — PATIENT HEALTH QUESTIONNAIRE - PHQ9
SUM OF ALL RESPONSES TO PHQ QUESTIONS 1-9: 3
10. IF YOU CHECKED OFF ANY PROBLEMS, HOW DIFFICULT HAVE THESE PROBLEMS MADE IT FOR YOU TO DO YOUR WORK, TAKE CARE OF THINGS AT HOME, OR GET ALONG WITH OTHER PEOPLE: NOT DIFFICULT AT ALL
SUM OF ALL RESPONSES TO PHQ QUESTIONS 1-9: 3

## 2024-01-08 ASSESSMENT — PAIN SCALES - GENERAL: PAINLEVEL: NO PAIN (0)

## 2024-01-08 NOTE — PROGRESS NOTES
Assessment and Plan:    Encounter for routine history and physical exam in female  Recommend consuming a healthy diet and exercising.  She declines hepatitis B vaccine, shingles vaccine, influenza vaccine, COVID-vaccine.  She is up-to-date on breast cancer colorectal cancer screening.  - CBC with platelets  - Comprehensive metabolic panel  - UA Macroscopic with reflex to Microscopic and Culture  - UA Macroscopic with reflex to Microscopic and Culture  - CBC with platelets  - Comprehensive metabolic panel    Cervical cancer screening  - Pap Screen with HPV - recommended age 30 - 65 years    Need for hepatitis C screening test  - Hepatitis C Screen Reflex to HCV RNA Quant and Genotype  - Hepatitis C Screen Reflex to HCV RNA Quant and Genotype    Lipid screening  Will check lipid cascade.  - Lipid panel reflex to direct LDL Non-fasting  - Lipid panel reflex to direct LDL Non-fasting    Screening for STD (sexually transmitted disease)  Discussed safe sex practices.  Will notify patient of results.  - HIV Antigen Antibody Combo Cascade  - Treponema Abs w Reflex to RPR and Titer  - Chlamydia trachomatis PCR  - Neisseria gonorrhoeae PCR  - HIV Antigen Antibody Combo Cascade  - Treponema Abs w Reflex to RPR and Titer    Insomnia, unspecified type  Discussed good sleep hygiene.  Will restart hydroxyzine as needed.  She is avoid taking this with other sedatives.  - hydrOXYzine (VISTARIL) 50 MG capsule  Dispense: 60 capsule; Refill: 2    Prediabetes  - Hemoglobin A1c  - Hemoglobin A1c    Thyroid nodule  Will check thyroid cascade  - TSH with free T4 reflex  - TSH with free T4 reflex    Class 1 obesity due to excess calories with serious comorbidity and body mass index (BMI) of 30.0 to 30.9 in adult  Patient is seeing the weight loss clinic.  This has contributed to prediabetes in the past.  Patient has lost weight.  She continues phentermine.      Subjective:     Meera is a 58 year old female presenting to the clinic for a  female physical.     LMP: since age 52; no bleeding since   Hx of abnormal pap smear: in her 20's, colposcopy, normal since   Last pap smear: 10/19/18 normal, negative HPV   Perform self-breast exams: occasionally   Vaginal discharge or irritation: none   Sexually active: yes, single   Contraception: condoms    Concerns for STDs: yes, no symptoms   Previous pregnancies: four pregnancies (vaginal deliveries)     Patient is seeing the weight loss clinic.  She is prescribed phentermine.  She is consuming a healthy diet and exercising regularly.  She has a history of prediabetes.  She is a multinodular goiter needing no further follow-up.  Patient is concerned as she is having a hard time falling asleep.  She feels as though she has been sleep hygiene.  She has ruminating thoughts at night.  She has not tried any over-the-counter products.  She has tried hydroxyzine in the past and would like to resume this.    Review of systems:  I performed a 10 point review of systems.  All pertinent positives and negatives are noted in the HPI. All others are negative.     No Known Allergies    Current Outpatient Medications   Medication    phentermine (ADIPEX-P) 37.5 MG capsule     No current facility-administered medications for this visit.       Social History     Socioeconomic History    Marital status: Single     Spouse name: Not on file    Number of children: Not on file    Years of education: Not on file    Highest education level: Not on file   Occupational History    Not on file   Tobacco Use    Smoking status: Never    Smokeless tobacco: Never   Substance and Sexual Activity    Alcohol use: No    Drug use: No    Sexual activity: Yes     Partners: Male     Comment: in relationship; in relationship   Other Topics Concern    Not on file   Social History Narrative    Not on file     Social Determinants of Health     Financial Resource Strain: Low Risk  (1/8/2024)    Financial Resource Strain     Within the past 12 months, have  "you or your family members you live with been unable to get utilities (heat, electricity) when it was really needed?: No   Food Insecurity: Low Risk  (1/8/2024)    Food Insecurity     Within the past 12 months, did you worry that your food would run out before you got money to buy more?: No     Within the past 12 months, did the food you bought just not last and you didn t have money to get more?: No   Transportation Needs: Low Risk  (1/8/2024)    Transportation Needs     Within the past 12 months, has lack of transportation kept you from medical appointments, getting your medicines, non-medical meetings or appointments, work, or from getting things that you need?: No   Physical Activity: Not on file   Stress: Not on file   Social Connections: Not on file   Interpersonal Safety: Not on file   Housing Stability: Low Risk  (1/8/2024)    Housing Stability     Do you have housing? : Yes     Are you worried about losing your housing?: No       No past medical history on file.    No family history on file.    Past Surgical History:   Procedure Laterality Date    TUBAL LIGATION      US BIOPSY THYROID FINE NEEDLE ASPIRATION  3/31/2020       Objective:     /63   Pulse 60   Temp 97.6  F (36.4  C)   Resp 13   Ht 1.588 m (5' 2.5\")   Wt 77.6 kg (171 lb)   SpO2 100%   Breastfeeding No   BMI 30.78 kg/m      Patient is alert, no obvious distress.   Skin: Warm, dry.  No rashes or lesions. Skin turgor rapid return.   HEENT:  Eyes normal.  Ears normal.  Nose patent, mucosa pink.  Oropharynx mucosa pink, no lesions or tonsil enlargement.   Neck:  Supple, without lymphadenopathy, bruits, JVD. Thyroid normal texture and size.    Lungs:  Clear to auscultation.  No wheezing, rales noted.  Respirations even and unlabored.   Heart:  Regular rate and rhythm.  No murmurs.   Breasts:  Normal.  No surrounding adenopathy.   Abdomen: Soft, nontender.  No organomegaly.  Bowel sounds normoactive.  No guarding or masses noted.   :  " External genitalia normal.  Normal vaginal mucosa.  Cervix no lesions or cervical motion tenderness.   Musculoskeletal:  Full ROM of extremities.  Muscle strength equal +5/5.   Neurological:  Cranial nerves 2-12 intact.              Answers submitted by the patient for this visit:  Patient Health Questionnaire (Submitted on 1/8/2024)  If you checked off any problems, how difficult have these problems made it for you to do your work, take care of things at home, or get along with other people?: Not difficult at all  PHQ9 TOTAL SCORE: 3  Annual Preventive Visit (Submitted on 1/8/2024)  Chief Complaint: Annual Exam:  Frequency of exercise:: 4-5 days/week  Getting at least 3 servings of Calcium per day:: NO  Diet:: Regular (no restrictions)  Taking medications regularly:: Not Applicable  Medication side effects:: Not applicable  Bi-annual eye exam:: Yes  Dental care twice a year:: Yes  Sleep apnea or symptoms of sleep apnea:: None  abdominal pain: No  Blood in stool: No  Blood in urine: No  chest pain: No  chills: No  congestion: No  constipation: No  cough: No  diarrhea: No  dizziness: No  ear pain: No  eye pain: No  nervous/anxious: No  fever: No  frequency: No  genital sores: No  headaches: No  hearing loss: No  heartburn: No  arthralgias: No  joint swelling: No  peripheral edema: No  mood changes: No  myalgias: No  nausea: No  dysuria: No  palpitations: No  Skin sensation changes: No  sore throat: No  urgency: No  rash: No  shortness of breath: No  visual disturbance: No  weakness: No  pelvic pain: No  vaginal bleeding: No  vaginal discharge: No  tenderness: No  breast mass: No  breast discharge: No  Additional concerns today:: Yes  Exercise outside of work (Submitted on 1/8/2024)  Chief Complaint: Annual Exam:  Duration of exercise:: 15-30 minutes

## 2024-01-09 LAB
ALBUMIN SERPL BCG-MCNC: 4.3 G/DL (ref 3.5–5.2)
ALP SERPL-CCNC: 96 U/L (ref 40–150)
ALT SERPL W P-5'-P-CCNC: 13 U/L (ref 0–50)
ANION GAP SERPL CALCULATED.3IONS-SCNC: 8 MMOL/L (ref 7–15)
AST SERPL W P-5'-P-CCNC: 26 U/L (ref 0–45)
BILIRUB SERPL-MCNC: 0.2 MG/DL
BUN SERPL-MCNC: 10.1 MG/DL (ref 6–20)
C TRACH DNA SPEC QL NAA+PROBE: NEGATIVE
CALCIUM SERPL-MCNC: 9.2 MG/DL (ref 8.6–10)
CHLORIDE SERPL-SCNC: 104 MMOL/L (ref 98–107)
CHOLEST SERPL-MCNC: 168 MG/DL
CREAT SERPL-MCNC: 0.86 MG/DL (ref 0.51–0.95)
DEPRECATED HCO3 PLAS-SCNC: 28 MMOL/L (ref 22–29)
EGFRCR SERPLBLD CKD-EPI 2021: 79 ML/MIN/1.73M2
FASTING STATUS PATIENT QL REPORTED: ABNORMAL
GLUCOSE SERPL-MCNC: 87 MG/DL (ref 70–99)
HDLC SERPL-MCNC: 51 MG/DL
HIV 1+2 AB+HIV1 P24 AG SERPL QL IA: NONREACTIVE
LDLC SERPL CALC-MCNC: 103 MG/DL
N GONORRHOEA DNA SPEC QL NAA+PROBE: NEGATIVE
NONHDLC SERPL-MCNC: 117 MG/DL
POTASSIUM SERPL-SCNC: 4.3 MMOL/L (ref 3.4–5.3)
PROT SERPL-MCNC: 7.7 G/DL (ref 6.4–8.3)
SODIUM SERPL-SCNC: 140 MMOL/L (ref 135–145)
T PALLIDUM AB SER QL: NONREACTIVE
TRIGL SERPL-MCNC: 70 MG/DL
TSH SERPL DL<=0.005 MIU/L-ACNC: 1.57 UIU/ML (ref 0.3–4.2)

## 2024-01-09 NOTE — PROGRESS NOTES
Bariatric Care Clinic Non Surgical Follow up Visit   Date of visit: 1/10/2024  Physician: REBECCA Richardson MD, MD  Primary Care is Allison Carranza  Meera Grier   58 year old  female     Initial Weight: 189#  Initial BMI: 33.5  Today's Weight:   Wt Readings from Last 1 Encounters:   01/10/24 77.6 kg (171 lb)     Body mass index is 30.76 kg/m .           Assessment and Plan   Assessment: Meera is a 58 year old year old female who presents for medical weight management.      Plan:    1. Obesity (BMI 30-39.9)  Patient was congratulated on her success thus far. Healthy habits to assist with further weight loss were discussed. She will continue to work on portion control and making healthy food choices. She will continue the phentermine. We discussed the patient's co-morbid conditions including prediabetes. This has improved with healthy habits and weight loss.     2. Prediabetes  This may improve with healthy habits and weight loss.      Follow up in 1 month with the dietician and in 3 months with myself            INTERIM HISTORY  Patient restarted phentermine after her last visit in September. She thinks that it is helping to control her appetite.    DIETARY HISTORY  Meals Per Day: 3  Eating Protein First?: usually  Food Diary: B:toast and egg and tony L:salad D:meat and potatoes and vegetables  Snacks Per Day: occasional  Typical Snack: popcorn, occasional strawberry milkshake  Fluid Intake: 5 bottles per day  Portion Control: yes  Calorie Containing Beverages: milkshake once a month    Meals at Restaurant per week:0-1    Positive Changes Since Last Visit: healthy meal and snack choices, exercise, water intake  Struggling With: none    Knowledgeable in Reading Food Labels: yes  Getting Adequate Protein: working  Sleeping 7-8 hours/day no (insomnia), works 2 jobs  Stress management not discussed    PHYSICAL ACTIVITY PATTERNS:  Gym at work 30 minutes 5 days per week, cardio and strength    REVIEW OF  "SYSTEMS  GENERAL/CONSTITUTIONAL:  Fatigue: no  HEENT:   glaucoma: no  CARDIOVASCULAR:  History of heart disease: no  GI:  Pancreatitis: stable  NEUROLOGIC:  Paresthesias: no  History of migraine headaches: no  PSYCHIATRIC:  Moods: stable  ENDOCRINE:  Monitoring Blood Sugars: no  Sugars Well Controlled: na  No personal or family history of medullary thyroid cancer no  :  Birth control: menopause  History of kidney stones: no     Patient Profile   Social History     Social History Narrative    Not on file        Past Medical History   History reviewed. No pertinent past medical history.  Patient Active Problem List   Diagnosis    Thyroid nodule    Vitamin D deficiency    Elevated sed rate    External hemorrhoid    Prediabetes       Past Surgical History  She has a past surgical history that includes tubal ligation and Us Biopsy Thyroid Fine Needle Aspiration (3/31/2020).     Examination   Ht 1.588 m (5' 2.52\")   Wt 77.6 kg (171 lb)   BMI 30.76 kg/m    Wt Readings from Last 4 Encounters:   01/10/24 77.6 kg (171 lb)   01/08/24 77.6 kg (171 lb)   05/30/23 81.6 kg (180 lb)   02/07/23 85.7 kg (189 lb)      BP Readings from Last 3 Encounters:   01/08/24 115/63   05/30/23 124/78   02/07/23 128/76    GENERAL: Healthy, alert and no distress  EYES: Eyes grossly normal to inspection.  No discharge or erythema, or obvious scleral/conjunctival abnormalities.  RESP: No audible wheeze, cough, or visible cyanosis.  No visible retractions or increased work of breathing.    SKIN: Visible skin clear. No significant rash, abnormal pigmentation or lesions.  NEURO: Cranial nerves grossly intact.  Mentation and speech appropriate for age.  PSYCH: Mentation appears normal, affect normal/bright, judgement and insight intact, normal speech and appearance well-groomed.        Counseling:   We reviewed the important post op bariatric recommendations:  -eating 3 meals daily  -eating protein first, getting >60gm protein daily  -eating slowly, " chewing food well  -avoiding/limiting calorie containing beverages  -limiting starchy vegetables and carbohydrates, choosing wheat, not white with breads,   crackers, pastas, christiano, bagels, tortillas, rice  -limiting restaurant or cafeteria eating to twice a week or less    We discussed the importance of restorative sleep and stress management in maintaining a healthy weight.  We discussed the National Weight Control Registry healthy weight maintenance strategies and ways to optimize metabolism.  We discussed the importance of physical activity including cardiovascular and strength training in maintaining a healthier weight.    Total time spent on the date of this encounter doing: chart review, review of test results, patient visit, physical exam, education, counseling, developing plan of care and documenting = 37 minutes.         REBECCA Richardson MD  [facility]

## 2024-01-10 ENCOUNTER — VIRTUAL VISIT (OUTPATIENT)
Dept: SURGERY | Facility: CLINIC | Age: 59
End: 2024-01-10
Payer: COMMERCIAL

## 2024-01-10 VITALS — HEIGHT: 63 IN | WEIGHT: 171 LBS | BODY MASS INDEX: 30.3 KG/M2

## 2024-01-10 DIAGNOSIS — R73.03 PREDIABETES: ICD-10-CM

## 2024-01-10 DIAGNOSIS — E66.9 OBESITY (BMI 30-39.9): Primary | ICD-10-CM

## 2024-01-10 LAB
BKR LAB AP GYN ADEQUACY: NORMAL
BKR LAB AP GYN INTERPRETATION: NORMAL
BKR LAB AP HPV REFLEX: NORMAL
BKR LAB AP PREVIOUS ABNORMAL: NORMAL
PATH REPORT.COMMENTS IMP SPEC: NORMAL
PATH REPORT.COMMENTS IMP SPEC: NORMAL
PATH REPORT.RELEVANT HX SPEC: NORMAL

## 2024-01-10 PROCEDURE — 99214 OFFICE O/P EST MOD 30 MIN: CPT | Mod: 95 | Performed by: FAMILY MEDICINE

## 2024-01-10 ASSESSMENT — PAIN SCALES - GENERAL: PAINLEVEL: NO PAIN (0)

## 2024-01-10 NOTE — PROGRESS NOTES
Virtual Visit Details    Type of service:  Video Visit     Originating Location (pt. Location): Home    Distant Location (provider location):  Off-site  Platform used for Video Visit: SaveMeeting  Video start time: 3:46 pm  Video end time: 4:12 pm

## 2024-01-10 NOTE — LETTER
1/10/2024         RE: Meera Grier  [address] [facility] [address]        Dear Colleague,    Thank you for referring your patient, Meera Grier, to the [facility] SURGERY CLINIC AND BARIATRICS CARE [place]. Please see a copy of my visit note below.    Bariatric Care Clinic Non Surgical Follow up Visit   Date of visit: 1/10/2024  Physician: REBECCA Richardson MD, MD  Primary Care is Allison Carranza  Meera Grier   58 year old  female     Initial Weight: 189#  Initial BMI: 33.5  Today's Weight:   Wt Readings from Last 1 Encounters:   01/10/24 77.6 kg (171 lb)     Body mass index is 30.76 kg/m .           Assessment and Plan   Assessment: Meera is a 58 year old year old female who presents for medical weight management.      Plan:    1. Obesity (BMI 30-39.9)  Patient was congratulated on her success thus far. Healthy habits to assist with further weight loss were discussed. She will continue to work on portion control and making healthy food choices. She will continue the phentermine. We discussed the patient's co-morbid conditions including prediabetes. This has improved with healthy habits and weight loss.     2. Prediabetes  This may improve with healthy habits and weight loss.      Follow up in 1 month with the dietician and in 3 months with myself            INTERIM HISTORY  Patient restarted phentermine after her last visit in September. She thinks that it is helping to control her appetite.    DIETARY HISTORY  Meals Per Day: 3  Eating Protein First?: usually  Food Diary: B:toast and egg and tony L:salad D:meat and potatoes and vegetables  Snacks Per Day: occasional  Typical Snack: popcorn, occasional strawberry milkshake  Fluid Intake: 5 bottles per day  Portion Control: yes  Calorie Containing Beverages: milkshake once a month    Meals at Restaurant per week:0-1    Positive Changes Since Last Visit: healthy meal and snack choices, exercise, water intake  Struggling With:  "none    Knowledgeable in Reading Food Labels: yes  Getting Adequate Protein: working  Sleeping 7-8 hours/day no (insomnia), works 2 jobs  Stress management not discussed    PHYSICAL ACTIVITY PATTERNS:  Gym at work 30 minutes 5 days per week, cardio and strength    REVIEW OF SYSTEMS  GENERAL/CONSTITUTIONAL:  Fatigue: no  HEENT:   glaucoma: no  CARDIOVASCULAR:  History of heart disease: no  GI:  Pancreatitis: stable  NEUROLOGIC:  Paresthesias: no  History of migraine headaches: no  PSYCHIATRIC:  Moods: stable  ENDOCRINE:  Monitoring Blood Sugars: no  Sugars Well Controlled: na  No personal or family history of medullary thyroid cancer no  :  Birth control: menopause  History of kidney stones: no     Patient Profile   Social History     Social History Narrative     Not on file        Past Medical History   History reviewed. No pertinent past medical history.  Patient Active Problem List   Diagnosis     Thyroid nodule     Vitamin D deficiency     Elevated sed rate     External hemorrhoid     Prediabetes       Past Surgical History  She has a past surgical history that includes tubal ligation and Us Biopsy Thyroid Fine Needle Aspiration (3/31/2020).     Examination   Ht 1.588 m (5' 2.52\")   Wt 77.6 kg (171 lb)   BMI 30.76 kg/m    Wt Readings from Last 4 Encounters:   01/10/24 77.6 kg (171 lb)   01/08/24 77.6 kg (171 lb)   05/30/23 81.6 kg (180 lb)   02/07/23 85.7 kg (189 lb)      BP Readings from Last 3 Encounters:   01/08/24 115/63   05/30/23 124/78   02/07/23 128/76    GENERAL: Healthy, alert and no distress  EYES: Eyes grossly normal to inspection.  No discharge or erythema, or obvious scleral/conjunctival abnormalities.  RESP: No audible wheeze, cough, or visible cyanosis.  No visible retractions or increased work of breathing.    SKIN: Visible skin clear. No significant rash, abnormal pigmentation or lesions.  NEURO: Cranial nerves grossly intact.  Mentation and speech appropriate for age.  PSYCH: Mentation " appears normal, affect normal/bright, judgement and insight intact, normal speech and appearance well-groomed.        Counseling:   We reviewed the important post op bariatric recommendations:  -eating 3 meals daily  -eating protein first, getting >60gm protein daily  -eating slowly, chewing food well  -avoiding/limiting calorie containing beverages  -limiting starchy vegetables and carbohydrates, choosing wheat, not white with breads,   crackers, pastas, christiano, bagels, tortillas, rice  -limiting restaurant or cafeteria eating to twice a week or less    We discussed the importance of restorative sleep and stress management in maintaining a healthy weight.  We discussed the National Weight Control Registry healthy weight maintenance strategies and ways to optimize metabolism.  We discussed the importance of physical activity including cardiovascular and strength training in maintaining a healthier weight.    Total time spent on the date of this encounter doing: chart review, review of test results, patient visit, physical exam, education, counseling, developing plan of care and documenting = 37 minutes.         REBECCA Richardson MD  [facility]           Virtual Visit Details    Type of service:  Video Visit     Originating Location (pt. Location): Home    Distant Location (provider location):  Off-site  Platform used for Video Visit: Localsensor  Video start time: 3:46 pm  Video end time: 4:12 pm      Again, thank you for allowing me to participate in the care of your patient.        Sincerely,        REBECCA Richardson MD

## 2024-01-10 NOTE — NURSING NOTE
Is the patient currently in the state of [place]? YES    Visit mode:VIDEO    If the visit is dropped, the patient can be reconnected by: VIDEO VISIT: Text to cell phone:   Telephone Information:   Mobile [phone]   Mobile [phone]       Will anyone else be joining the visit? NO  (If patient encounters technical issues they should call [phone])    How would you like to obtain your AVS? MyChart    Are changes needed to the allergy or medication list? Pt stated no changes to allergies and Pt stated no med changes    Reason for visit: Follow Up    Fernanda THOMAS

## 2024-01-12 LAB
HUMAN PAPILLOMA VIRUS 16 DNA: NEGATIVE
HUMAN PAPILLOMA VIRUS 18 DNA: NEGATIVE
HUMAN PAPILLOMA VIRUS FINAL DIAGNOSIS: NORMAL
HUMAN PAPILLOMA VIRUS OTHER HR: NEGATIVE

## 2024-01-15 ENCOUNTER — PATIENT OUTREACH (OUTPATIENT)
Dept: FAMILY MEDICINE | Facility: CLINIC | Age: 59
End: 2024-01-15
Payer: COMMERCIAL

## 2024-01-15 PROBLEM — D06.9 CIN III WITH SEVERE DYSPLASIA: Status: ACTIVE | Noted: 2024-01-15

## 2024-01-15 PROBLEM — R87.619 ABNORMAL PAP SMEAR OF CERVIX: Status: ACTIVE | Noted: 2024-01-15

## 2024-07-09 ENCOUNTER — PATIENT OUTREACH (OUTPATIENT)
Dept: CARE COORDINATION | Facility: CLINIC | Age: 59
End: 2024-07-09
Payer: COMMERCIAL

## 2024-07-19 ENCOUNTER — HOSPITAL ENCOUNTER (OUTPATIENT)
Dept: MAMMOGRAPHY | Facility: CLINIC | Age: 59
Discharge: HOME | End: 2024-07-19
Attending: NURSE PRACTITIONER | Admitting: NURSE PRACTITIONER
Payer: COMMERCIAL

## 2024-07-19 DIAGNOSIS — Z12.31 VISIT FOR SCREENING MAMMOGRAM: ICD-10-CM

## 2024-07-19 PROCEDURE — 77063 BREAST TOMOSYNTHESIS BI: CPT

## 2024-12-20 PROBLEM — K64.4 EXTERNAL HEMORRHOID: Status: RESOLVED | Noted: 2018-02-01 | Resolved: 2024-12-20

## 2025-01-04 ENCOUNTER — PATIENT OUTREACH (OUTPATIENT)
Dept: CARE COORDINATION | Facility: CLINIC | Age: 60
End: 2025-01-04
Payer: COMMERCIAL

## 2025-01-14 ENCOUNTER — OFFICE VISIT (OUTPATIENT)
Dept: FAMILY MEDICINE | Facility: CLINIC | Age: 60
End: 2025-01-14
Attending: NURSE PRACTITIONER
Payer: COMMERCIAL

## 2025-01-14 VITALS
TEMPERATURE: 97.3 F | HEART RATE: 75 BPM | SYSTOLIC BLOOD PRESSURE: 113 MMHG | HEIGHT: 63 IN | WEIGHT: 178 LBS | BODY MASS INDEX: 31.54 KG/M2 | OXYGEN SATURATION: 98 % | DIASTOLIC BLOOD PRESSURE: 72 MMHG | RESPIRATION RATE: 18 BRPM

## 2025-01-14 DIAGNOSIS — Z00.00 ENCOUNTER FOR ROUTINE HISTORY AND PHYSICAL EXAM IN FEMALE: Primary | ICD-10-CM

## 2025-01-14 DIAGNOSIS — E66.811 CLASS 1 OBESITY WITH SERIOUS COMORBIDITY AND BODY MASS INDEX (BMI) OF 32.0 TO 32.9 IN ADULT, UNSPECIFIED OBESITY TYPE: ICD-10-CM

## 2025-01-14 DIAGNOSIS — Z13.220 LIPID SCREENING: ICD-10-CM

## 2025-01-14 DIAGNOSIS — Z11.3 SCREENING FOR STD (SEXUALLY TRANSMITTED DISEASE): ICD-10-CM

## 2025-01-14 DIAGNOSIS — E04.1 THYROID NODULE: ICD-10-CM

## 2025-01-14 DIAGNOSIS — Z79.899 MEDICATION MANAGEMENT: ICD-10-CM

## 2025-01-14 DIAGNOSIS — R73.03 PREDIABETES: ICD-10-CM

## 2025-01-14 DIAGNOSIS — Z12.4 CERVICAL CANCER SCREENING: ICD-10-CM

## 2025-01-14 DIAGNOSIS — Z12.11 SCREEN FOR COLON CANCER: ICD-10-CM

## 2025-01-14 DIAGNOSIS — N95.0 POSTMENOPAUSAL BLEEDING: ICD-10-CM

## 2025-01-14 LAB
ALBUMIN UR-MCNC: NEGATIVE MG/DL
APPEARANCE UR: CLEAR
BILIRUB UR QL STRIP: NEGATIVE
COLOR UR AUTO: YELLOW
ERYTHROCYTE [DISTWIDTH] IN BLOOD BY AUTOMATED COUNT: 11.5 % (ref 10–15)
EST. AVERAGE GLUCOSE BLD GHB EST-MCNC: 114 MG/DL
GLUCOSE UR STRIP-MCNC: NEGATIVE MG/DL
HBA1C MFR BLD: 5.6 % (ref 0–5.6)
HCT VFR BLD AUTO: 36.7 % (ref 35–47)
HGB BLD-MCNC: 12.1 G/DL (ref 11.7–15.7)
HGB UR QL STRIP: NEGATIVE
KETONES UR STRIP-MCNC: NEGATIVE MG/DL
LEUKOCYTE ESTERASE UR QL STRIP: NEGATIVE
MCH RBC QN AUTO: 30.6 PG (ref 26.5–33)
MCHC RBC AUTO-ENTMCNC: 33 G/DL (ref 31.5–36.5)
MCV RBC AUTO: 93 FL (ref 78–100)
NITRATE UR QL: NEGATIVE
PH UR STRIP: 7 [PH] (ref 5–8)
PLATELET # BLD AUTO: 343 10E3/UL (ref 150–450)
RBC # BLD AUTO: 3.96 10E6/UL (ref 3.8–5.2)
SP GR UR STRIP: 1.02 (ref 1–1.03)
T PALLIDUM AB SER QL: NONREACTIVE
UROBILINOGEN UR STRIP-ACNC: 0.2 E.U./DL
WBC # BLD AUTO: 5.6 10E3/UL (ref 4–11)

## 2025-01-14 PROCEDURE — 99396 PREV VISIT EST AGE 40-64: CPT | Performed by: NURSE PRACTITIONER

## 2025-01-14 PROCEDURE — 87389 HIV-1 AG W/HIV-1&-2 AB AG IA: CPT | Performed by: NURSE PRACTITIONER

## 2025-01-14 PROCEDURE — 87624 HPV HI-RISK TYP POOLED RSLT: CPT | Performed by: NURSE PRACTITIONER

## 2025-01-14 PROCEDURE — 87591 N.GONORRHOEAE DNA AMP PROB: CPT | Performed by: NURSE PRACTITIONER

## 2025-01-14 PROCEDURE — 86780 TREPONEMA PALLIDUM: CPT | Performed by: NURSE PRACTITIONER

## 2025-01-14 PROCEDURE — 85027 COMPLETE CBC AUTOMATED: CPT | Performed by: NURSE PRACTITIONER

## 2025-01-14 PROCEDURE — 83036 HEMOGLOBIN GLYCOSYLATED A1C: CPT | Performed by: NURSE PRACTITIONER

## 2025-01-14 PROCEDURE — 80061 LIPID PANEL: CPT | Performed by: NURSE PRACTITIONER

## 2025-01-14 PROCEDURE — 80053 COMPREHEN METABOLIC PANEL: CPT | Performed by: NURSE PRACTITIONER

## 2025-01-14 PROCEDURE — 87491 CHLMYD TRACH DNA AMP PROBE: CPT | Performed by: NURSE PRACTITIONER

## 2025-01-14 PROCEDURE — 99214 OFFICE O/P EST MOD 30 MIN: CPT | Mod: 25 | Performed by: NURSE PRACTITIONER

## 2025-01-14 PROCEDURE — 36415 COLL VENOUS BLD VENIPUNCTURE: CPT | Performed by: NURSE PRACTITIONER

## 2025-01-14 PROCEDURE — 84443 ASSAY THYROID STIM HORMONE: CPT | Performed by: NURSE PRACTITIONER

## 2025-01-14 PROCEDURE — 81003 URINALYSIS AUTO W/O SCOPE: CPT | Performed by: NURSE PRACTITIONER

## 2025-01-14 PROCEDURE — 84146 ASSAY OF PROLACTIN: CPT | Performed by: NURSE PRACTITIONER

## 2025-01-14 SDOH — HEALTH STABILITY: PHYSICAL HEALTH: ON AVERAGE, HOW MANY DAYS PER WEEK DO YOU ENGAGE IN MODERATE TO STRENUOUS EXERCISE (LIKE A BRISK WALK)?: 5 DAYS

## 2025-01-14 SDOH — HEALTH STABILITY: PHYSICAL HEALTH: ON AVERAGE, HOW MANY MINUTES DO YOU ENGAGE IN EXERCISE AT THIS LEVEL?: 30 MIN

## 2025-01-14 ASSESSMENT — PATIENT HEALTH QUESTIONNAIRE - PHQ9
SUM OF ALL RESPONSES TO PHQ QUESTIONS 1-9: 2
SUM OF ALL RESPONSES TO PHQ QUESTIONS 1-9: 2
10. IF YOU CHECKED OFF ANY PROBLEMS, HOW DIFFICULT HAVE THESE PROBLEMS MADE IT FOR YOU TO DO YOUR WORK, TAKE CARE OF THINGS AT HOME, OR GET ALONG WITH OTHER PEOPLE: NOT DIFFICULT AT ALL

## 2025-01-14 ASSESSMENT — PAIN SCALES - GENERAL: PAINLEVEL_OUTOF10: NO PAIN (0)

## 2025-01-14 ASSESSMENT — SOCIAL DETERMINANTS OF HEALTH (SDOH): HOW OFTEN DO YOU GET TOGETHER WITH FRIENDS OR RELATIVES?: THREE TIMES A WEEK

## 2025-01-14 NOTE — PROGRESS NOTES
Assessment and Plan:    Encounter for routine history and physical exam in female  Recommend consuming a healthy diet and exercising.  She declines vaccines.  She is up-to-date on breast cancer screening.  She is due for colonoscopy.  Pap smear obtained.  - CBC with platelets  - Hemoglobin A1c  - UA Macroscopic with reflex to Microscopic and Culture  - UA Macroscopic with reflex to Microscopic and Culture  - CBC with platelets  - Hemoglobin A1c    Lipid screening  - Lipid panel reflex to direct LDL Non-fasting  - Lipid panel reflex to direct LDL Non-fasting    Cervical cancer screening  - HPV and Gynecologic Cytology Panel - Recommended Age 30 - 65 Years    Screening for STD (sexually transmitted disease)  Discussed safe sex practices.  Will notify patient of results.  - HIV Antigen Antibody Combo Cascade  - Treponema Abs w Reflex to RPR and Titer  - Chlamydia trachomatis PCR  - Neisseria gonorrhoeae PCR  - HIV Antigen Antibody Combo Cascade  - Treponema Abs w Reflex to RPR and Titer    Screen for colon cancer  - Colonoscopy Screening  Referral    Postmenopausal bleeding  Will rule out underlying thyroid disease and hyperprolactinemia.  Will obtain pelvic ultrasound to rule out endometrial hyperplasia and uterine fibroids.  Further plans pending the results.  - TSH with free T4 reflex  - Prolactin  - US Pelvic Complete with Transvaginal  - TSH with free T4 reflex  - Prolactin    Thyroid nodule  She is due for repeat ultrasound in 2 years.  Will check thyroid cascade.  -TSH    Prediabetes  Will check A1c due to history of prediabetes.  -A1C    Class 1 obesity with serious comorbidity and body mass index (BMI) of 32.0 to 32.9 in adult, unspecified obesity type  Patient is followed by the bariatric clinic.  She is receiving phentermine.  Recommend consuming a healthy diet and exercising as this is contributing to prediabetes.    Medication management  - Comprehensive metabolic panel  - Comprehensive metabolic  panel      Subjective:     Meera is a 59 year old female presenting to the clinic for a female physical.     LMP: since age 52   Hx of abnormal pap smear: in her 20's, colposcopy, normal since   Last pap smear: 1/8/24 normal, negative HPV   Perform self-breast exams: occasionally   Vaginal discharge or irritation: none   Sexually active: no, single   Contraception: condoms    Concerns for STDs: yes, no symptoms   Previous pregnancies: four pregnancies (vaginal deliveries)      Patient has a history of a thyroid goiter.  Ultrasound last year showed stability. She is due for an ultrasound in 2 years.  She has a history of prediabetes.  She is consuming a healthy diet and has been exercising 30 minutes daily.  She is taking phentermine and has been seeing the weight loss clinic.    Patient is concerned of postmenopausal bleeding.  She has noticed a brown discoloration in her underwear intermittently for multiple months.  She denies menstrual cramping.  She has not had any urinary symptoms including dysuria, urinary urgency, urinary frequency, hematuria.  She denies blood or mucus in the stool.    Review of systems:  I performed a 10 point review of systems.  All pertinent positives and negatives are noted in the HPI. All others are negative.     No Known Allergies    Current Outpatient Medications   Medication Sig Dispense Refill    hydrOXYzine (VISTARIL) 50 MG capsule Take 1-2 capsules ( mg) by mouth nightly as needed (for sleep) 60 capsule 2    phentermine (ADIPEX-P) 37.5 MG capsule Take 1 capsule (37.5 mg) by mouth every morning 90 capsule 0     No current facility-administered medications for this visit.       Social History     Socioeconomic History    Marital status: Single     Spouse name: Not on file    Number of children: Not on file    Years of education: Not on file    Highest education level: Not on file   Occupational History    Not on file   Tobacco Use    Smoking status: Never     Passive  exposure: Never    Smokeless tobacco: Never   Vaping Use    Vaping status: Never Used   Substance and Sexual Activity    Alcohol use: No    Drug use: No    Sexual activity: Yes     Partners: Male     Comment: in relationship; in relationship   Other Topics Concern    Not on file   Social History Narrative    Not on file     Social Drivers of Health     Financial Resource Strain: Low Risk  (1/14/2025)    Financial Resource Strain     Within the past 12 months, have you or your family members you live with been unable to get utilities (heat, electricity) when it was really needed?: No   Food Insecurity: Low Risk  (1/14/2025)    Food Insecurity     Within the past 12 months, did you worry that your food would run out before you got money to buy more?: No     Within the past 12 months, did the food you bought just not last and you didn t have money to get more?: No   Transportation Needs: Low Risk  (1/14/2025)    Transportation Needs     Within the past 12 months, has lack of transportation kept you from medical appointments, getting your medicines, non-medical meetings or appointments, work, or from getting things that you need?: No   Physical Activity: Sufficiently Active (1/14/2025)    Exercise Vital Sign     Days of Exercise per Week: 5 days     Minutes of Exercise per Session: 30 min   Stress: No Stress Concern Present (1/14/2025)    [employer] [place] [employer] - Occupational Stress Questionnaire     Feeling of Stress : Only a little   Social Connections: Unknown (1/14/2025)    Social Connection and Isolation Panel [NHANES]     Frequency of Communication with Friends and Family: Not on file     Frequency of Social Gatherings with Friends and Family: Three times a week     Attends Voodoo Services: Not on file     Active Member of Clubs or Organizations: Not on file     Attends Club or Organization Meetings: Not on file     Marital Status: Not on file   Interpersonal Safety: Low Risk  (1/14/2025)     "Interpersonal Safety     Do you feel physically and emotionally safe where you currently live?: Yes     Within the past 12 months, have you been hit, slapped, kicked or otherwise physically hurt by someone?: No     Within the past 12 months, have you been humiliated or emotionally abused in other ways by your partner or ex-partner?: No   Housing Stability: Low Risk  (1/14/2025)    Housing Stability     Do you have housing? : Yes     Are you worried about losing your housing?: No       Past Medical History:   Diagnosis Date    External hemorrhoid 02/01/2018       No family history on file.    Past Surgical History:   Procedure Laterality Date    AS CONIZATION CERVIX,KNIFE/LASER      TUBAL LIGATION      US BIOPSY THYROID FINE NEEDLE ASPIRATION  03/31/2020       Objective:     /72   Pulse 75   Temp 97.3  F (36.3  C)   Resp 18   Ht 1.588 m (5' 2.5\")   Wt 80.7 kg (178 lb)   SpO2 98%   Breastfeeding No   BMI 32.04 kg/m      Patient is alert, no obvious distress.   Skin: Warm, dry.  No rashes or lesions. Skin turgor rapid return.   HEENT:  Eyes normal.  Ears normal.  Nose patent, mucosa pink.  Oropharynx mucosa pink, no lesions or tonsil enlargement.   Neck:  Supple, without lymphadenopathy, bruits, JVD. Thyroid normal texture and size.    Lungs:  Clear to auscultation.  No wheezing, rales noted.  Respirations even and unlabored.   Heart:  Regular rate and rhythm.  No murmurs.   Breasts:  Normal.  No surrounding adenopathy.   Abdomen: Soft, nontender.  No organomegaly.  Bowel sounds normoactive.  No guarding or masses noted.   :  External genitalia normal.  Normal vaginal mucosa.  Cervix no lesions or cervical motion tenderness.   Musculoskeletal:  Full ROM of extremities.  Muscle strength equal +5/5.   Neurological:  Cranial nerves 2-12 intact.            Answers submitted by the patient for this visit:  Patient Health Questionnaire (Submitted on 1/14/2025)  If you checked off any problems, how difficult " have these problems made it for you to do your work, take care of things at home, or get along with other people?: Not difficult at all  PHQ9 TOTAL SCORE: 2

## 2025-01-15 LAB
ALBUMIN SERPL BCG-MCNC: 4.3 G/DL (ref 3.5–5.2)
ALP SERPL-CCNC: 95 U/L (ref 40–150)
ALT SERPL W P-5'-P-CCNC: 16 U/L (ref 0–50)
ANION GAP SERPL CALCULATED.3IONS-SCNC: 9 MMOL/L (ref 7–15)
AST SERPL W P-5'-P-CCNC: 24 U/L (ref 0–45)
BILIRUB SERPL-MCNC: 0.2 MG/DL
BUN SERPL-MCNC: 8.5 MG/DL (ref 6–20)
C TRACH DNA SPEC QL NAA+PROBE: NEGATIVE
CALCIUM SERPL-MCNC: 9.6 MG/DL (ref 8.8–10.4)
CHLORIDE SERPL-SCNC: 104 MMOL/L (ref 98–107)
CHOLEST SERPL-MCNC: 179 MG/DL
CREAT SERPL-MCNC: 0.92 MG/DL (ref 0.51–0.95)
EGFRCR SERPLBLD CKD-EPI 2021: 72 ML/MIN/1.73M2
FASTING STATUS PATIENT QL REPORTED: ABNORMAL
FASTING STATUS PATIENT QL REPORTED: NORMAL
GLUCOSE SERPL-MCNC: 72 MG/DL (ref 70–99)
HCO3 SERPL-SCNC: 28 MMOL/L (ref 22–29)
HDLC SERPL-MCNC: 64 MG/DL
HIV 1+2 AB+HIV1 P24 AG SERPL QL IA: NONREACTIVE
HPV HR 12 DNA CVX QL NAA+PROBE: NEGATIVE
HPV16 DNA CVX QL NAA+PROBE: NEGATIVE
HPV18 DNA CVX QL NAA+PROBE: NEGATIVE
HUMAN PAPILLOMA VIRUS FINAL DIAGNOSIS: NORMAL
LDLC SERPL CALC-MCNC: 103 MG/DL
N GONORRHOEA DNA SPEC QL NAA+PROBE: NEGATIVE
NONHDLC SERPL-MCNC: 115 MG/DL
POTASSIUM SERPL-SCNC: 4.5 MMOL/L (ref 3.4–5.3)
PROLACTIN SERPL 3RD IS-MCNC: 6 NG/ML (ref 5–23)
PROT SERPL-MCNC: 7.9 G/DL (ref 6.4–8.3)
SODIUM SERPL-SCNC: 141 MMOL/L (ref 135–145)
SPECIMEN TYPE: NORMAL
SPECIMEN TYPE: NORMAL
TRIGL SERPL-MCNC: 62 MG/DL
TSH SERPL DL<=0.005 MIU/L-ACNC: 2.1 UIU/ML (ref 0.3–4.2)

## 2025-01-20 LAB
BKR AP ASSOCIATED HPV REPORT: NORMAL
BKR LAB AP GYN ADEQUACY: NORMAL
BKR LAB AP GYN INTERPRETATION: NORMAL
BKR LAB AP PREVIOUS ABNORMAL: NORMAL
PATH REPORT.COMMENTS IMP SPEC: NORMAL
PATH REPORT.COMMENTS IMP SPEC: NORMAL
PATH REPORT.RELEVANT HX SPEC: NORMAL

## 2025-01-31 ENCOUNTER — HOSPITAL ENCOUNTER (OUTPATIENT)
Dept: ULTRASOUND IMAGING | Facility: CLINIC | Age: 60
Discharge: HOME | End: 2025-01-31
Attending: NURSE PRACTITIONER | Admitting: NURSE PRACTITIONER
Payer: COMMERCIAL

## 2025-01-31 DIAGNOSIS — N95.0 POSTMENOPAUSAL BLEEDING: ICD-10-CM

## 2025-01-31 PROCEDURE — 76856 US EXAM PELVIC COMPLETE: CPT

## 2025-07-10 NOTE — PROGRESS NOTES
Meera Grier is 59 year old  female who presents for a billable video visit today.      Video Start Time: 3:48 PM    Video-Visit Details    Type of service:  Video Visit    Platform used for Video Visit: [employer]    Video End Time (time video stopped): 4:04 PM    Originating Location (pt. Location): in her car in [place]      Distant Location (provider location):  Off-site    Distant Location (provider location):  [facility]          Bariatric Care Clinic Non Surgical Follow up Visit   Date of visit: 7/16/2025  Physician: REBECCA Richardson MD, MD  Primary Care is Allison Carranza  Meera Grier   59 year old  female     Initial Weight: 189#  Initial BMI: 33.5  Today's Weight:   Wt Readings from Last 1 Encounters:   02/28/25 77.1 kg (170 lb)     There is no height or weight on file to calculate BMI.           Assessment and Plan   Assessment: Meera is a 59 year old year old female who presents for medical weight management.      Plan:    1. Obesity (BMI 30-39.9) (Primary)  Patient was congratulated on her success thus far. Healthy habits to assist with further weight loss were discussed. She is ready to get back on track. She will restart the phentermine. She would also be a candidate for a GLP1 injection. We discussed the patient's co-morbid conditions including prediabetes. This likely will improve with healthy habits and weight loss.     2. Prediabetes  This may improve with healthy habits and weight loss.      Follow up next available with myself and the dietician           INTERIM HISTORY  Patient was taking phentermine for appetite and craving control and stopped them in April because she ran out. She has noticed hunger since stopping them. She denies side effects. Her eating habits have been off a bit because she got a new job and is sitting more.    DIETARY HISTORY  She has been off track lately with her meals    Fluid Intake: working on it  Portion  Control: yes  Calorie Containing Beverages: when she goes out to eat  Meals at Restaurant per week:0-1, orders healthy food    Positive Changes Since Last Visit: off track  Struggling With: off track with diet and exercise lately    Knowledgeable in Reading Food Labels: not sure  Getting Adequate Protein: not sure    PHYSICAL ACTIVITY PATTERNS:  She was doing 30 minute workout every day but stopped when she started her new job a couple of months ago. She plans to get a membership at the gym and do cardio and weights    REVIEW OF SYSTEMS  GENERAL/CONSTITUTIONAL:  Fatigue: no  HEENT:   glaucoma: no  CARDIOVASCULAR:  History of heart disease: no  GI:  Pancreatitis: no  NEUROLOGIC:  Paresthesias: no  History of migraine headaches: history of  PSYCHIATRIC:  Moods: stable  ENDOCRINE:  Monitoring Blood Sugars: no  Sugars Well Controlled: na  No personal or family history of medullary      Patient Profile   Social History     Social History Narrative    Not on file        Past Medical History   Past Medical History:   Diagnosis Date    External hemorrhoid 02/01/2018     Patient Active Problem List   Diagnosis    Thyroid nodule    Vitamin D deficiency    Elevated sed rate    Prediabetes    GEE III with severe dysplasia    Multinodular goiter       Past Surgical History  She has a past surgical history that includes tubal ligation; Us Biopsy Thyroid Fine Needle Aspiration (03/31/2020); and CONIZATION CERVIX,KNIFE/LASER.     Examination   There were no vitals taken for this visit.  Wt Readings from Last 4 Encounters:   02/28/25 77.1 kg (170 lb)   01/14/25 80.7 kg (178 lb)   01/10/24 77.6 kg (171 lb)   01/08/24 77.6 kg (171 lb)      BP Readings from Last 3 Encounters:   01/14/25 113/72   01/08/24 115/63   05/30/23 124/78      GENERAL: alert and no distress  EYES: Eyes grossly normal to inspection.  No discharge or erythema, or obvious scleral/conjunctival abnormalities.  RESP: No audible wheeze, cough, or visible cyanosis.     SKIN: Visible skin clear. No significant rash, abnormal pigmentation or lesions.  NEURO: Cranial nerves grossly intact.  Mentation and speech appropriate for age.  PSYCH: Appropriate affect, tone, and pace of words        Counseling:   We reviewed the important post op bariatric recommendations:  -eating 3 meals daily  -eating protein first, getting >60gm protein daily  -eating slowly, chewing food well  -avoiding/limiting calorie containing beverages  -limiting starchy vegetables and carbohydrates, choosing wheat, not white with breads,   crackers, pastas, christiano, bagels, tortillas, rice  -limiting restaurant or cafeteria eating to twice a week or less    We discussed the importance of restorative sleep and stress management in maintaining a healthy weight.  We discussed the National Weight Control Registry healthy weight maintenance strategies and ways to optimize metabolism.  We discussed the importance of physical activity including cardiovascular and strength training in maintaining a healthier weight.    Total time spent on the date of this encounter doing: chart review, review of test results, patient visit, physical exam, education, counseling, developing plan of care and documenting = 38 minutes.         REBECCA Richardson MD  [facility]

## 2025-07-16 ENCOUNTER — VIRTUAL VISIT (OUTPATIENT)
Dept: SURGERY | Facility: CLINIC | Age: 60
End: 2025-07-16
Payer: COMMERCIAL

## 2025-07-16 DIAGNOSIS — E66.9 OBESITY (BMI 30-39.9): Primary | ICD-10-CM

## 2025-07-16 DIAGNOSIS — R73.03 PREDIABETES: ICD-10-CM

## 2025-07-16 PROCEDURE — 98006 SYNCH AUDIO-VIDEO EST MOD 30: CPT | Performed by: FAMILY MEDICINE

## 2025-07-16 RX ORDER — PHENTERMINE HYDROCHLORIDE 37.5 MG/1
TABLET ORAL
Qty: 90 TABLET | Refills: 0 | Status: SHIPPED | OUTPATIENT
Start: 2025-07-16

## 2025-07-16 NOTE — LETTER
7/16/2025      Meera Grier  [address] [address]      Dear Colleague,    Thank you for referring your patient, Meera Grier, to the [facility]. Please see a copy of my visit note below.    Meera Grier is 59 year old  female who presents for a billable video visit today.      Video Start Time: 3:48 PM    Video-Visit Details    Type of service:  Video Visit    Platform used for Video Visit: [employer]    Video End Time (time video stopped): 4:04 PM    Originating Location (pt. Location): in her car in [place]      Distant Location (provider location):  Off-site    Distant Location (provider location[facility]          Bariatric Care Clinic Non Surgical Follow up Visit   Date of visit: 7/16/2025  Physician: REBECCA Richardson MD, MD  Primary Care is Allison Carranza  Meera Grier   59 year old  female     Initial Weight: 189#  Initial BMI: 33.5  Today's Weight:   Wt Readings from Last 1 Encounters:   02/28/25 77.1 kg (170 lb)     There is no height or weight on file to calculate BMI.           Assessment and Plan   Assessment: Meera is a 59 year old year old female who presents for medical weight management.      Plan:    1. Obesity (BMI 30-39.9) (Primary)  Patient was congratulated on her success thus far. Healthy habits to assist with further weight loss were discussed. She is ready to get back on track. She will restart the phentermine. She would also be a candidate for a GLP1 injection. We discussed the patient's co-morbid conditions including prediabetes. This likely will improve with healthy habits and weight loss.     2. Prediabetes  This may improve with healthy habits and weight loss.      Follow up next available with myself and the dietician           INTERIM HISTORY  Patient was taking phentermine for appetite and craving control and stopped them in April because she  ran out. She has noticed hunger since stopping them. She denies side effects. Her eating habits have been off a bit because she got a new job and is sitting more.    DIETARY HISTORY  She has been off track lately with her meals    Fluid Intake: working on it  Portion Control: yes  Calorie Containing Beverages: when she goes out to eat  Meals at Restaurant per week:0-1, orders healthy food    Positive Changes Since Last Visit: off track  Struggling With: off track with diet and exercise lately    Knowledgeable in [place] Food Labels: not sure  Getting Adequate Protein: not sure    PHYSICAL ACTIVITY PATTERNS:  She was doing 30 minute workout every day but stopped when she started her new job a couple of months ago. She plans to get a membership at the gym and do cardio and weights    REVIEW OF SYSTEMS  GENERAL/CONSTITUTIONAL:  Fatigue: no  HEENT:   glaucoma: no  CARDIOVASCULAR:  History of heart disease: no  GI:  Pancreatitis: no  NEUROLOGIC:  Paresthesias: no  History of migraine headaches: history of  PSYCHIATRIC:  Moods: stable  ENDOCRINE:  Monitoring Blood Sugars: no  Sugars Well Controlled: na  No personal or family history of medullary      Patient Profile   Social History     Social History Narrative     Not on file        Past Medical History   Past Medical History:   Diagnosis Date     External hemorrhoid 02/01/2018     Patient Active Problem List   Diagnosis     Thyroid nodule     Vitamin D deficiency     Elevated sed rate     Prediabetes     GEE III with severe dysplasia     Multinodular goiter       Past Surgical History  She has a past surgical history that includes tubal ligation; Us Biopsy Thyroid Fine Needle Aspiration (03/31/2020); and CONIZATION CERVIX,KNIFE/LASER.     Examination   There were no vitals taken for this visit.  Wt Readings from Last 4 Encounters:   02/28/25 77.1 kg (170 lb)   01/14/25 80.7 kg (178 lb)   01/10/24 77.6 kg (171 lb)   01/08/24 77.6 kg (171 lb)      BP Readings from Last 3  Encounters:   01/14/25 113/72   01/08/24 115/63   05/30/23 124/78      GENERAL: alert and no distress  EYES: Eyes grossly normal to inspection.  No discharge or erythema, or obvious scleral/conjunctival abnormalities.  RESP: No audible wheeze, cough, or visible cyanosis.    SKIN: Visible skin clear. No significant rash, abnormal pigmentation or lesions.  NEURO: Cranial nerves grossly intact.  Mentation and speech appropriate for age.  PSYCH: Appropriate affect, tone, and pace of words        Counseling:   We reviewed the important post op bariatric recommendations:  -eating 3 meals daily  -eating protein first, getting >60gm protein daily  -eating slowly, chewing food well  -avoiding/limiting calorie containing beverages  -limiting starchy vegetables and carbohydrates, choosing wheat, not white with breads,   crackers, pastas, christiano, bagels, tortillas, rice  -limiting restaurant or cafeteria eating to twice a week or less    We discussed the importance of restorative sleep and stress management in maintaining a healthy weight.  We discussed the National Weight Control Registry healthy weight maintenance strategies and ways to optimize metabolism.  We discussed the importance of physical activity including cardiovascular and strength training in maintaining a healthier weight.    Total time spent on the date of this encounter doing: chart review, review of test results, patient visit, physical exam, education, counseling, developing plan of care and documenting = 38 minutes.         REBECCA Richardson MD  [facility]             Again, thank you for allowing me to participate in the care of your patient.        Sincerely,        REBECCA Richardson MD    Electronically signed

## 2025-07-16 NOTE — PATIENT INSTRUCTIONS
